# Patient Record
Sex: FEMALE | URBAN - METROPOLITAN AREA
[De-identification: names, ages, dates, MRNs, and addresses within clinical notes are randomized per-mention and may not be internally consistent; named-entity substitution may affect disease eponyms.]

---

## 2022-02-16 ENCOUNTER — EMERGENCY (EMERGENCY)
Facility: HOSPITAL | Age: 37
LOS: 0 days | Discharge: HOME | End: 2022-02-16
Attending: EMERGENCY MEDICINE | Admitting: EMERGENCY MEDICINE
Payer: COMMERCIAL

## 2022-02-16 VITALS
RESPIRATION RATE: 19 BRPM | OXYGEN SATURATION: 100 % | DIASTOLIC BLOOD PRESSURE: 64 MMHG | SYSTOLIC BLOOD PRESSURE: 144 MMHG | TEMPERATURE: 98 F | HEART RATE: 86 BPM

## 2022-02-16 DIAGNOSIS — R74.02 ELEVATION OF LEVELS OF LACTIC ACID DEHYDROGENASE [LDH]: ICD-10-CM

## 2022-02-16 DIAGNOSIS — R35.0 FREQUENCY OF MICTURITION: ICD-10-CM

## 2022-02-16 DIAGNOSIS — N20.0 CALCULUS OF KIDNEY: ICD-10-CM

## 2022-02-16 DIAGNOSIS — R11.0 NAUSEA: ICD-10-CM

## 2022-02-16 DIAGNOSIS — R10.9 UNSPECIFIED ABDOMINAL PAIN: ICD-10-CM

## 2022-02-16 LAB
ALBUMIN SERPL ELPH-MCNC: 5 G/DL — SIGNIFICANT CHANGE UP (ref 3.5–5.2)
ALP SERPL-CCNC: 50 U/L — SIGNIFICANT CHANGE UP (ref 30–115)
ALT FLD-CCNC: 19 U/L — SIGNIFICANT CHANGE UP (ref 0–41)
ANION GAP SERPL CALC-SCNC: 21 MMOL/L — HIGH (ref 7–14)
APPEARANCE UR: CLEAR — SIGNIFICANT CHANGE UP
AST SERPL-CCNC: 25 U/L — SIGNIFICANT CHANGE UP (ref 0–41)
BACTERIA # UR AUTO: NEGATIVE — SIGNIFICANT CHANGE UP
BASOPHILS # BLD AUTO: 0.04 K/UL — SIGNIFICANT CHANGE UP (ref 0–0.2)
BASOPHILS NFR BLD AUTO: 0.5 % — SIGNIFICANT CHANGE UP (ref 0–1)
BILIRUB SERPL-MCNC: 0.4 MG/DL — SIGNIFICANT CHANGE UP (ref 0.2–1.2)
BILIRUB UR-MCNC: NEGATIVE — SIGNIFICANT CHANGE UP
BUN SERPL-MCNC: 15 MG/DL — SIGNIFICANT CHANGE UP (ref 10–20)
CALCIUM SERPL-MCNC: 10.2 MG/DL — HIGH (ref 8.5–10.1)
CHLORIDE SERPL-SCNC: 101 MMOL/L — SIGNIFICANT CHANGE UP (ref 98–110)
CO2 SERPL-SCNC: 18 MMOL/L — SIGNIFICANT CHANGE UP (ref 17–32)
COLOR SPEC: YELLOW — SIGNIFICANT CHANGE UP
CREAT SERPL-MCNC: 1 MG/DL — SIGNIFICANT CHANGE UP (ref 0.7–1.5)
DIFF PNL FLD: ABNORMAL
EOSINOPHIL # BLD AUTO: 0.05 K/UL — SIGNIFICANT CHANGE UP (ref 0–0.7)
EOSINOPHIL NFR BLD AUTO: 0.6 % — SIGNIFICANT CHANGE UP (ref 0–8)
EPI CELLS # UR: 2 /HPF — SIGNIFICANT CHANGE UP (ref 0–5)
GLUCOSE SERPL-MCNC: 119 MG/DL — HIGH (ref 70–99)
GLUCOSE UR QL: NEGATIVE — SIGNIFICANT CHANGE UP
HCG SERPL QL: NEGATIVE — SIGNIFICANT CHANGE UP
HCT VFR BLD CALC: 38.1 % — SIGNIFICANT CHANGE UP (ref 37–47)
HGB BLD-MCNC: 12.5 G/DL — SIGNIFICANT CHANGE UP (ref 12–16)
HYALINE CASTS # UR AUTO: 3 /LPF — SIGNIFICANT CHANGE UP (ref 0–7)
IMM GRANULOCYTES NFR BLD AUTO: 1 % — HIGH (ref 0.1–0.3)
KETONES UR-MCNC: ABNORMAL
LACTATE SERPL-SCNC: 1.3 MMOL/L — SIGNIFICANT CHANGE UP (ref 0.7–2)
LACTATE SERPL-SCNC: 4.5 MMOL/L — CRITICAL HIGH (ref 0.7–2)
LEUKOCYTE ESTERASE UR-ACNC: NEGATIVE — SIGNIFICANT CHANGE UP
LIDOCAIN IGE QN: 42 U/L — SIGNIFICANT CHANGE UP (ref 7–60)
LYMPHOCYTES # BLD AUTO: 1.49 K/UL — SIGNIFICANT CHANGE UP (ref 1.2–3.4)
LYMPHOCYTES # BLD AUTO: 18.2 % — LOW (ref 20.5–51.1)
MCHC RBC-ENTMCNC: 28 PG — SIGNIFICANT CHANGE UP (ref 27–31)
MCHC RBC-ENTMCNC: 32.8 G/DL — SIGNIFICANT CHANGE UP (ref 32–37)
MCV RBC AUTO: 85.4 FL — SIGNIFICANT CHANGE UP (ref 81–99)
MONOCYTES # BLD AUTO: 0.33 K/UL — SIGNIFICANT CHANGE UP (ref 0.1–0.6)
MONOCYTES NFR BLD AUTO: 4 % — SIGNIFICANT CHANGE UP (ref 1.7–9.3)
NEUTROPHILS # BLD AUTO: 6.21 K/UL — SIGNIFICANT CHANGE UP (ref 1.4–6.5)
NEUTROPHILS NFR BLD AUTO: 75.7 % — HIGH (ref 42.2–75.2)
NITRITE UR-MCNC: NEGATIVE — SIGNIFICANT CHANGE UP
NRBC # BLD: 0 /100 WBCS — SIGNIFICANT CHANGE UP (ref 0–0)
PH UR: 8 — SIGNIFICANT CHANGE UP (ref 5–8)
PLATELET # BLD AUTO: 318 K/UL — SIGNIFICANT CHANGE UP (ref 130–400)
POTASSIUM SERPL-MCNC: 4.5 MMOL/L — SIGNIFICANT CHANGE UP (ref 3.5–5)
POTASSIUM SERPL-SCNC: 4.5 MMOL/L — SIGNIFICANT CHANGE UP (ref 3.5–5)
PROT SERPL-MCNC: 8 G/DL — SIGNIFICANT CHANGE UP (ref 6–8)
PROT UR-MCNC: SIGNIFICANT CHANGE UP
RBC # BLD: 4.46 M/UL — SIGNIFICANT CHANGE UP (ref 4.2–5.4)
RBC # FLD: 13.3 % — SIGNIFICANT CHANGE UP (ref 11.5–14.5)
RBC CASTS # UR COMP ASSIST: 5 /HPF — HIGH (ref 0–4)
SODIUM SERPL-SCNC: 140 MMOL/L — SIGNIFICANT CHANGE UP (ref 135–146)
SP GR SPEC: 1.02 — SIGNIFICANT CHANGE UP (ref 1.01–1.03)
UROBILINOGEN FLD QL: SIGNIFICANT CHANGE UP
WBC # BLD: 8.2 K/UL — SIGNIFICANT CHANGE UP (ref 4.8–10.8)
WBC # FLD AUTO: 8.2 K/UL — SIGNIFICANT CHANGE UP (ref 4.8–10.8)
WBC UR QL: 2 /HPF — SIGNIFICANT CHANGE UP (ref 0–5)

## 2022-02-16 PROCEDURE — 99285 EMERGENCY DEPT VISIT HI MDM: CPT

## 2022-02-16 PROCEDURE — 74177 CT ABD & PELVIS W/CONTRAST: CPT | Mod: 26,MA

## 2022-02-16 PROCEDURE — 76830 TRANSVAGINAL US NON-OB: CPT | Mod: 26

## 2022-02-16 RX ORDER — TAMSULOSIN HYDROCHLORIDE 0.4 MG/1
1 CAPSULE ORAL
Qty: 5 | Refills: 0
Start: 2022-02-16 | End: 2022-02-20

## 2022-02-16 RX ORDER — KETOROLAC TROMETHAMINE 30 MG/ML
30 SYRINGE (ML) INJECTION ONCE
Refills: 0 | Status: DISCONTINUED | OUTPATIENT
Start: 2022-02-16 | End: 2022-02-16

## 2022-02-16 RX ORDER — KETOROLAC TROMETHAMINE 30 MG/ML
1 SYRINGE (ML) INJECTION
Qty: 20 | Refills: 0
Start: 2022-02-16 | End: 2022-02-20

## 2022-02-16 RX ORDER — MORPHINE SULFATE 50 MG/1
4 CAPSULE, EXTENDED RELEASE ORAL ONCE
Refills: 0 | Status: DISCONTINUED | OUTPATIENT
Start: 2022-02-16 | End: 2022-02-16

## 2022-02-16 RX ORDER — SODIUM CHLORIDE 9 MG/ML
1000 INJECTION INTRAMUSCULAR; INTRAVENOUS; SUBCUTANEOUS ONCE
Refills: 0 | Status: COMPLETED | OUTPATIENT
Start: 2022-02-16 | End: 2022-02-16

## 2022-02-16 RX ORDER — TAMSULOSIN HYDROCHLORIDE 0.4 MG/1
0.4 CAPSULE ORAL AT BEDTIME
Refills: 0 | Status: DISCONTINUED | OUTPATIENT
Start: 2022-02-16 | End: 2022-02-16

## 2022-02-16 RX ORDER — ONDANSETRON 8 MG/1
4 TABLET, FILM COATED ORAL ONCE
Refills: 0 | Status: COMPLETED | OUTPATIENT
Start: 2022-02-16 | End: 2022-02-16

## 2022-02-16 RX ADMIN — SODIUM CHLORIDE 1000 MILLILITER(S): 9 INJECTION INTRAMUSCULAR; INTRAVENOUS; SUBCUTANEOUS at 14:23

## 2022-02-16 RX ADMIN — MORPHINE SULFATE 4 MILLIGRAM(S): 50 CAPSULE, EXTENDED RELEASE ORAL at 12:53

## 2022-02-16 RX ADMIN — Medication 30 MILLIGRAM(S): at 13:54

## 2022-02-16 RX ADMIN — MORPHINE SULFATE 4 MILLIGRAM(S): 50 CAPSULE, EXTENDED RELEASE ORAL at 13:47

## 2022-02-16 RX ADMIN — Medication 30 MILLIGRAM(S): at 14:09

## 2022-02-16 RX ADMIN — ONDANSETRON 4 MILLIGRAM(S): 8 TABLET, FILM COATED ORAL at 12:41

## 2022-02-16 RX ADMIN — MORPHINE SULFATE 4 MILLIGRAM(S): 50 CAPSULE, EXTENDED RELEASE ORAL at 12:42

## 2022-02-16 RX ADMIN — TAMSULOSIN HYDROCHLORIDE 0.4 MILLIGRAM(S): 0.4 CAPSULE ORAL at 16:33

## 2022-02-16 RX ADMIN — MORPHINE SULFATE 4 MILLIGRAM(S): 50 CAPSULE, EXTENDED RELEASE ORAL at 14:09

## 2022-02-16 RX ADMIN — SODIUM CHLORIDE 1000 MILLILITER(S): 9 INJECTION INTRAMUSCULAR; INTRAVENOUS; SUBCUTANEOUS at 13:35

## 2022-02-16 NOTE — ED PROVIDER NOTE - ATTENDING CONTRIBUTION TO CARE
36-year-old female presenting for evaluation of severe right-sided flank pain radiating to her right anterior abdomen since this morning.  Pain is associated with nausea and urinary frequency.  Denies hematuria, fever, chills, bloody stool, vaginal bleeding or discharge.  No such symptoms in the past. Female sitting on a chair appears uncomfortable due to pain, PERRL, pain conjunctiva, MMM, normal work of breathing,, speaking full sentences, right-sided abdominal wall and right CVA TTP, no leg edema, no focal neuro deficits, A&O x3.  Impression: Suspected renal colic, will rule out  ovarian pathology as well.  Plan: Pain control, IVF hydration, labs, pelvic sono, CT abdomen pelvis, reassess.

## 2022-02-16 NOTE — ED PROVIDER NOTE - NS ED ROS FT
Review of Systems:  	•	CONSTITUTIONAL - no fever, no diaphoresis, no chills  	•	SKIN - no rash  	•	HEMATOLOGIC - no bleeding, no bruising  	•	EYES - no eye pain, no blurry vision  	•	ENT - no congestion  	•	RESPIRATORY - no shortness of breath, no cough  	•	CARDIAC - no chest pain, no palpitations  	•	GI - + abd pain, + nausea, no vomiting, no diarrhea, no constipation  	•	GENITO-URINARY - +flank pain, no dysuria; no hematuria, + increased urinary frequency  	•	MUSCULOSKELETAL - no joint paint, no swelling, no redness  	•	NEUROLOGIC - no weakness, no headache, no paresthesias, no LOC  	•	PSYCH - no anxiety, non suicidal, non homicidal, no hallucination, no depression  	All other ROS are negative except as documented in HPI.

## 2022-02-16 NOTE — ED ADULT NURSE NOTE - OBJECTIVE STATEMENT
36 year old female alert and oriented c/o severe abdominal/flank pain, n/v. NO past med hx stated. VSS, IV placed

## 2022-02-16 NOTE — ED PROVIDER NOTE - PROVIDER TOKENS
PROVIDER:[TOKEN:[[id number]],FOLLOWUP:[1-3 Days]],PROVIDER:[TOKEN:[[id number]],FOLLOWUP:[Routine]]

## 2022-02-16 NOTE — ED PROVIDER NOTE - PROGRESS NOTE DETAILS
EP: The patient appears  more comfortable, pain well controlled, UA and CT scan are pending.  Continue observation. EP: CT scan shows a 3 x 3 x 2 mm stone in the right UVJ.  Initial lactate was elevated, repeat was weakness was sent after fluid administration.  All test results were discussed with the patient including incidental findings of irregularity of the cervix, she was advised to follow-up with her OB/GYN and with urology.  Strict return precautions given.  Patient verbalized understanding and is amenable with the plan. EP: CT scan shows a 3 x 3 x 2 mm stone in the right UVJ.  Initial lactate was elevated, repeat was weakness was sent after fluid administration.  All test results were discussed with the patient including incidental findings of irregularity of the cervix, she was advised to follow-up with her OB/GYN and with urology.  Strict return precautions given.  Patient verbalized understanding and is amenable with the plan. Patient to be discharged from ED. Any available test results were discussed with patient and/or family. Verbal instructions given, including instructions to return to ED immediately for any new, worsening, or concerning symptoms. Patient endorsed understanding. Written discharge instructions additionally given, including follow-up plan.  Patient was given opportunity to ask questions.

## 2022-02-16 NOTE — ED ADULT NURSE NOTE - NSIMPLEMENTINTERV_GEN_ALL_ED
Implemented All Universal Safety Interventions:  [place] to call system. Call bell, personal items and telephone within reach. Instruct patient to call for assistance. Room bathroom lighting operational. Non-slip footwear when patient is off stretcher. Physically safe environment: no spills, clutter or unnecessary equipment. Stretcher in lowest position, wheels locked, appropriate side rails in place.

## 2022-02-16 NOTE — ED PROVIDER NOTE - PATIENT PORTAL LINK FT
You can access the FollowMyHealth Patient Portal offered by [facility] by registering at the following website: [email or web address]. By joining [employer]’s FollowMyHealth portal, you will also be able to view your health information using other applications (apps) compatible with our system.

## 2022-02-16 NOTE — ED PROVIDER NOTE - PHYSICAL EXAMINATION
VITAL SIGNS: I have reviewed nursing notes and confirm.  CONSTITUTIONAL: Well-developed; well-nourished; in no acute distress.  SKIN: Skin exam is warm and dry, no acute rash.  HEAD: Normocephalic; atraumatic.  EYES: PERRL, EOM intact; conjunctiva and sclera clear.  ENT: No nasal discharge; airway clear.   NECK: Supple; non tender.  CARD: S1, S2 normal; no murmurs, gallops, or rubs. Regular rate and rhythm.  RESP: Clear to auscultation bilaterally. No wheezes, rales or rhonchi.  ABD: Normal bowel sounds; soft; non-distended; non-tender. +Right sided CVA tenderness.   EXT: Normal ROM. No edema.  LYMPH: No acute cervical adenopathy.  NEURO: Alert, oriented. Grossly unremarkable. No focal deficits.  PSYCH: Cooperative, appropriate.

## 2022-02-16 NOTE — ED PROVIDER NOTE - CARE PROVIDER_API CALL
Tone Owens)  Urology  [address] [facility] [address] [address] [facility], [address]  [place] [address]  Phone: [phone]  Fax: [phone]  Follow Up Time: 1-3 Days    Kenn Garcia)  Obstetrics and Gynecology  [address] [place] [address]  Phone: [phone]  Fax: [phone]  Follow Up Time: Routine

## 2022-02-16 NOTE — ED PROVIDER NOTE - OBJECTIVE STATEMENT
37 yo F with no pmhx presenting with sudden onset of severe right sided flank pain radiating to right lower abdomen since this morning. Symptoms are moderate. Associated with nausea and urinary frequency. No cp, sob, fever, chills, vomiting, diarrhea, vaginal discharge, dysuria, headache, dizziness, paresthesias, or weakness.

## 2022-02-16 NOTE — ED PROVIDER NOTE - CARE PROVIDERS DIRECT ADDRESSES
,[email or web address]

## 2022-02-17 ENCOUNTER — INPATIENT (INPATIENT)
Facility: HOSPITAL | Age: 37
LOS: 3 days | Discharge: HOME | End: 2022-02-21
Attending: UROLOGY | Admitting: UROLOGY
Payer: COMMERCIAL

## 2022-02-17 VITALS
OXYGEN SATURATION: 98 % | TEMPERATURE: 99 F | HEART RATE: 124 BPM | DIASTOLIC BLOOD PRESSURE: 62 MMHG | SYSTOLIC BLOOD PRESSURE: 105 MMHG | WEIGHT: 132.06 LBS | RESPIRATION RATE: 20 BRPM

## 2022-02-17 LAB
ALBUMIN SERPL ELPH-MCNC: 3.9 G/DL — SIGNIFICANT CHANGE UP (ref 3.5–5.2)
ALP SERPL-CCNC: 86 U/L — SIGNIFICANT CHANGE UP (ref 30–115)
ALT FLD-CCNC: 20 U/L — SIGNIFICANT CHANGE UP (ref 0–41)
ANION GAP SERPL CALC-SCNC: 14 MMOL/L — SIGNIFICANT CHANGE UP (ref 7–14)
ANISOCYTOSIS BLD QL: SLIGHT — SIGNIFICANT CHANGE UP
AST SERPL-CCNC: 21 U/L — SIGNIFICANT CHANGE UP (ref 0–41)
BASOPHILS # BLD AUTO: 0 K/UL — SIGNIFICANT CHANGE UP (ref 0–0.2)
BASOPHILS NFR BLD AUTO: 0 % — SIGNIFICANT CHANGE UP (ref 0–1)
BILIRUB SERPL-MCNC: 1.2 MG/DL — SIGNIFICANT CHANGE UP (ref 0.2–1.2)
BUN SERPL-MCNC: 20 MG/DL — SIGNIFICANT CHANGE UP (ref 10–20)
BURR CELLS BLD QL SMEAR: PRESENT — SIGNIFICANT CHANGE UP
CALCIUM SERPL-MCNC: 9 MG/DL — SIGNIFICANT CHANGE UP (ref 8.5–10.1)
CHLORIDE SERPL-SCNC: 99 MMOL/L — SIGNIFICANT CHANGE UP (ref 98–110)
CO2 SERPL-SCNC: 21 MMOL/L — SIGNIFICANT CHANGE UP (ref 17–32)
CREAT SERPL-MCNC: 1 MG/DL — SIGNIFICANT CHANGE UP (ref 0.7–1.5)
EOSINOPHIL # BLD AUTO: 0 K/UL — SIGNIFICANT CHANGE UP (ref 0–0.7)
EOSINOPHIL NFR BLD AUTO: 0 % — SIGNIFICANT CHANGE UP (ref 0–8)
GIANT PLATELETS BLD QL SMEAR: PRESENT — SIGNIFICANT CHANGE UP
GLUCOSE SERPL-MCNC: 88 MG/DL — SIGNIFICANT CHANGE UP (ref 70–99)
HCT VFR BLD CALC: 31.8 % — LOW (ref 37–47)
HGB BLD-MCNC: 10.7 G/DL — LOW (ref 12–16)
LACTATE SERPL-SCNC: 1.3 MMOL/L — SIGNIFICANT CHANGE UP (ref 0.7–2)
LYMPHOCYTES # BLD AUTO: 0 % — LOW (ref 20.5–51.1)
LYMPHOCYTES # BLD AUTO: 0 K/UL — LOW (ref 1.2–3.4)
MANUAL SMEAR VERIFICATION: SIGNIFICANT CHANGE UP
MCHC RBC-ENTMCNC: 28.3 PG — SIGNIFICANT CHANGE UP (ref 27–31)
MCHC RBC-ENTMCNC: 33.6 G/DL — SIGNIFICANT CHANGE UP (ref 32–37)
MCV RBC AUTO: 84.1 FL — SIGNIFICANT CHANGE UP (ref 81–99)
MICROCYTES BLD QL: SLIGHT — SIGNIFICANT CHANGE UP
MONOCYTES # BLD AUTO: 0 K/UL — LOW (ref 0.1–0.6)
MONOCYTES NFR BLD AUTO: 0 % — LOW (ref 1.7–9.3)
NEUTROPHILS # BLD AUTO: 5.22 K/UL — SIGNIFICANT CHANGE UP (ref 1.4–6.5)
NEUTROPHILS NFR BLD AUTO: 95.6 % — HIGH (ref 42.2–75.2)
NEUTS BAND # BLD: 1.8 % — SIGNIFICANT CHANGE UP (ref 0–6)
OVALOCYTES BLD QL SMEAR: SLIGHT — SIGNIFICANT CHANGE UP
PLAT MORPH BLD: ABNORMAL
PLATELET # BLD AUTO: 140 K/UL — SIGNIFICANT CHANGE UP (ref 130–400)
POIKILOCYTOSIS BLD QL AUTO: SLIGHT — SIGNIFICANT CHANGE UP
POTASSIUM SERPL-MCNC: 3.4 MMOL/L — LOW (ref 3.5–5)
POTASSIUM SERPL-SCNC: 3.4 MMOL/L — LOW (ref 3.5–5)
PROT SERPL-MCNC: 6.1 G/DL — SIGNIFICANT CHANGE UP (ref 6–8)
RBC # BLD: 3.78 M/UL — LOW (ref 4.2–5.4)
RBC # FLD: 13.5 % — SIGNIFICANT CHANGE UP (ref 11.5–14.5)
RBC BLD AUTO: ABNORMAL
SARS-COV-2 RNA SPEC QL NAA+PROBE: SIGNIFICANT CHANGE UP
SODIUM SERPL-SCNC: 134 MMOL/L — LOW (ref 135–146)
VARIANT LYMPHS # BLD: 2.6 % — SIGNIFICANT CHANGE UP (ref 0–5)
WBC # BLD: 5.36 K/UL — SIGNIFICANT CHANGE UP (ref 4.8–10.8)
WBC # FLD AUTO: 5.36 K/UL — SIGNIFICANT CHANGE UP (ref 4.8–10.8)

## 2022-02-17 PROCEDURE — 99285 EMERGENCY DEPT VISIT HI MDM: CPT

## 2022-02-17 RX ORDER — TAMSULOSIN HYDROCHLORIDE 0.4 MG/1
0.4 CAPSULE ORAL ONCE
Refills: 0 | Status: COMPLETED | OUTPATIENT
Start: 2022-02-17 | End: 2022-02-17

## 2022-02-17 RX ORDER — ONDANSETRON 8 MG/1
4 TABLET, FILM COATED ORAL ONCE
Refills: 0 | Status: COMPLETED | OUTPATIENT
Start: 2022-02-17 | End: 2022-02-17

## 2022-02-17 RX ORDER — SODIUM CHLORIDE 9 MG/ML
1000 INJECTION, SOLUTION INTRAVENOUS ONCE
Refills: 0 | Status: COMPLETED | OUTPATIENT
Start: 2022-02-17 | End: 2022-02-17

## 2022-02-17 RX ORDER — MORPHINE SULFATE 50 MG/1
4 CAPSULE, EXTENDED RELEASE ORAL ONCE
Refills: 0 | Status: DISCONTINUED | OUTPATIENT
Start: 2022-02-17 | End: 2022-02-17

## 2022-02-17 RX ADMIN — MORPHINE SULFATE 4 MILLIGRAM(S): 50 CAPSULE, EXTENDED RELEASE ORAL at 20:21

## 2022-02-17 RX ADMIN — SODIUM CHLORIDE 1000 MILLILITER(S): 9 INJECTION, SOLUTION INTRAVENOUS at 21:30

## 2022-02-17 RX ADMIN — SODIUM CHLORIDE 1000 MILLILITER(S): 9 INJECTION, SOLUTION INTRAVENOUS at 20:21

## 2022-02-17 RX ADMIN — TAMSULOSIN HYDROCHLORIDE 0.4 MILLIGRAM(S): 0.4 CAPSULE ORAL at 21:06

## 2022-02-17 RX ADMIN — ONDANSETRON 4 MILLIGRAM(S): 8 TABLET, FILM COATED ORAL at 20:21

## 2022-02-17 RX ADMIN — MORPHINE SULFATE 4 MILLIGRAM(S): 50 CAPSULE, EXTENDED RELEASE ORAL at 20:36

## 2022-02-17 RX ADMIN — SODIUM CHLORIDE 1000 MILLILITER(S): 9 INJECTION, SOLUTION INTRAVENOUS at 22:18

## 2022-02-17 NOTE — ED PROVIDER NOTE - NS ED ROS FT
Constitutional:  See HPI.   Eyes:  No visual changes, eye pain or discharge.  ENMT:  No hearing changes, pain, discharge or infections. No neck pain or stiffness.  Cardiac:  No chest pain, SOB or edema. No chest pain with exertion.  Respiratory:  No cough or respiratory distress. No hemoptysis.  GI:  No nausea, vomiting, diarrhea, abdominal pain.  :  dysuria, no frequency,   MS:  No joint pain or back pain.  Neuro:  No LOC. No headache or weakness.    Skin:  No skin rash.  Except as in HPI, all other review of systems is negative

## 2022-02-17 NOTE — CONSULT NOTE ADULT - ASSESSMENT
36 year old female with no pmh presents to the ED for evaluation of severe right-sided flank pain radiating to her right anterior abdomen since yesterday. Patient feeling better than yesterday overall, but still with mild right sided flank pain that is adequately controlled after receiving IV fluids, zofran and morphine.      CTAP which revealed a 4q8x4hi stone at the UVJ with mild right hydronephrosis and a US pelvic showing b/l ovarian cysts.    Plan:  - Case discussed with Dr. Pineda, recommendations are as follows:  - No acute surgical/ intervention indicated at this time - patient nontoxic appearing, pain controlled, WBC and Cr WNL.   - Discussed options with patient regarding trial of passage at home, trial of passage on admission, and the possible need for stent placement should the patient develop sepsis or intractable pain/ intractable vomiting despite pharmacologic management -  Patient electing for trial of passage at home as her pain and nausea is well controlled at this time. (Patient was not sent home with antiemetics yesterday and would like to be prescribed an antiemetic).  - Spoke with ED attending who will continue to bolus the patient, continue pain control and to monitor patient for now   - Recommend flomax if not contraindicated  - Continue pain control prn  - Continue antiemetics prn  - Encourage increased hydration  - Encourage increased ambulation  - Recommend strain all urine to catch the stone if it passes so that stone may be sent for analysis   - Recommend OP f/u with Dr. Pineda this week for further urologic management - Please call the office tomorrow morning for an appointment. Will email office and make them aware of patient encounter and the need for outpatient follow up so that office will also reach out to patient to schedule follow up.  - If patient is discharged, made sure patient understands to return to ED if develops fever >100.3, intractable pain/vomiting  - Recommend patient be discharged on Flomax, Toradol and Zofran.  - ED team and patient agreeable to plan  36 year old female with no significant pmh presents to the ED for evaluation of severe right-sided flank pain radiating to her right anterior abdomen since yesterday. Patient feeling better than yesterday overall, but still with mild right sided flank pain that is adequately controlled after receiving IV fluids, zofran and morphine.      CTAP which revealed a 5p0l1ne stone at the UVJ with mild right hydronephrosis and a US pelvic showing b/l ovarian cysts.    Plan:  - Case discussed with Dr. Pineda, recommendations are as follows:  - No acute surgical/ intervention indicated at this time - patient nontoxic appearing, pain controlled, WBC and Cr WNL.   - Discussed options with patient regarding trial of passage at home, trial of passage on admission, and the possible need for stent placement should the patient develop sepsis or intractable pain/ intractable vomiting despite pharmacologic management -  Patient electing for trial of passage at home as her pain and nausea is well controlled at this time. (Patient was not sent home with antiemetics yesterday and would like to be prescribed an antiemetic).  - Spoke with ED attending who will continue to bolus the patient, continue pain control and to monitor patient for now   - Recommend flomax if not contraindicated  - Continue pain control prn  - Continue antiemetics prn  - Encourage increased hydration  - Encourage increased ambulation  - Recommend strain all urine to catch the stone if it passes so that stone may be sent for analysis   - Recommend OP f/u with Dr. Pineda this week for further urologic management - Please call the office tomorrow morning for an appointment. Emailed office so they are aware of patient encounter and the need for outpatient follow up - office will also reach out to patient to schedule follow up.  - If patient is discharged, made sure patient understands to return to ED if develops fever >100.3, intractable pain/vomiting  - Recommend patient be discharged on Flomax, Toradol and Zofran.  - ED team and patient agreeable to plan

## 2022-02-17 NOTE — CONSULT NOTE ADULT - ATTENDING COMMENTS
seen at bedside  has been hypertensive during day  WBC went up since yesterday  will plan for sugery today -- emergeny right ureteral stent placement  cont abx post op  will need definitive treatemnt of stone and removal of stent as outpatient

## 2022-02-17 NOTE — ED PROVIDER NOTE - CLINICAL SUMMARY MEDICAL DECISION MAKING FREE TEXT BOX
Patient presented due to inability to tolerate p.o., started to feel better in ED but remained tachycardic, given concerns in setting of nephrolithiasis patient admitted to urology for further treatment and monitoring

## 2022-02-17 NOTE — ED PROVIDER NOTE - ATTENDING CONTRIBUTION TO CARE
36-year-old female with PMH nephrolithiasis presented complaining of persistent vomiting and right flank pain. Patient seen yesterday diagnosed with nephrolithiasis [id number] at right UVJ. Sx improved briefly but then recurred and since unable to tolerate meds or PO. Patient reports discomfort persistent but not worsened. No fevers, chills, dizziness, CP, SOB, cough or palpitations.    VITAL SIGNS: noted  CONSTITUTIONAL: Well-developed; well-nourished; in no acute distress  HEAD: Normocephalic; atraumatic  EYES: PERRL, EOM intact; conjunctiva and sclera clear  ENT: No nasal discharge; airway clear. MMM  NECK: Supple; non tender. No anterior cervical lymphadenopathy noted  CARD: S1, S2 normal; no murmurs, gallops, or rubs. Tachycardic.  RESP: CTAB/L, no wheezes, rales or rhonchi  ABD: Normal bowel sounds; soft; non-distended; non-tender; + right CVA tenderness  EXT: Normal ROM. No calf tenderness or edema. Distal pulses intact  NEURO: Alert, oriented. Grossly unremarkable. No focal deficits  SKIN: Skin exam is warm and dry, no acute rash  MS: No midline spinal tenderness

## 2022-02-17 NOTE — ED PROVIDER NOTE - CARE PLAN
1 Principal Discharge DX:	Kidney stone   Principal Discharge DX:	Kidney stone  Secondary Diagnosis:	Hypotension

## 2022-02-17 NOTE — ED PROVIDER NOTE - MDM PATIENT STATEMENT FOR ADDL TREATMENT
Patient with one or more new problems requiring additional work-up/treatment.
Health Care Proxy (HCP)

## 2022-02-17 NOTE — ED PROVIDER NOTE - PHYSICAL EXAMINATION
CONSTITUTIONAL: Well-appearing; well-nourished; in no apparent distress.   HEAD: Normocephalic; atraumatic.   EYES: PERRL; EOM intact. Conjunctiva normal B/L.   ENT: Normal pharynx with no tonsillar hypertrophy. MMM.  NECK: Supple; non-tender; no cervical lymphadenopathy.   CHEST: Normal chest excursion with respiration.   CARDIOVASCULAR: Normal S1, S2; no murmurs, rubs, or gallops.   RESPIRATORY: Normal chest excursion with respiration; breath sounds clear and equal bilaterally;   GI/: Normal bowel sounds; non-distended; non-tender. R flank pain  BACK: No evidence of trauma or deformity. Non-tender to palpation. +R CVA tenderness, ttp w/ radiation to the groin  EXT: Normal ROM in all four extremities; non-tender to palpation; distal pulses are normal. No leg edema B/L.   SKIN: Normal for age and race; warm; dry; good turgor.  NEURO: A & O x 4; CN 2-12 intact. Grossly unremarkable.

## 2022-02-17 NOTE — ED ADULT TRIAGE NOTE - CHIEF COMPLAINT QUOTE
c/o nausea and vomiting . patient was seen yesterday and was sent home with PO meds for kidney stone  . Patient c/o right sided flank pain since she is not tolerating prescribed meds .

## 2022-02-17 NOTE — ED PROVIDER NOTE - PROGRESS NOTE DETAILS
Tn - spoke w/ PA Faiz; recommends IV bolus, pain control, start flomas, CBC, BMP, renal bladder us to assess jets and stone position Discussed borderline BP, tachycardia with Urology PA and pt. Recommended admission for septic potential. Pt declines at this time, will sign out AMA. Risks and potential for decompensation discussed, pt understands. Will f/u with Urology clinic in the AM. Pt changed her mind, will stay in the hospital. Urology will not admit to their service because they don't plan on intervening or doing a procedure. Will admit to medicine.

## 2022-02-17 NOTE — ED PROVIDER NOTE - OBJECTIVE STATEMENT
36 y F presenting for evaluation of severe right-sided flank pain radiating to her right anterior abdomen since yesterday.  Pain is associated with nausea, vomiting, unable to tolerate PO. pt states that she was feeling better after discharge yesterday but then was unable to tolerate PO Rx, solids, and fluids today. pt states she feels dehydrated and intense pain of the R flank radiating to the R anterior abdomen. CT scan from yesterday shows a [id number] stone at the UVJ, US pelvic w/ b/l ovarian cysts. pt denies sob/cp.

## 2022-02-18 LAB
ALBUMIN SERPL ELPH-MCNC: 3.1 G/DL — LOW (ref 3.5–5.2)
ALP SERPL-CCNC: 44 U/L — SIGNIFICANT CHANGE UP (ref 30–115)
ALT FLD-CCNC: 22 U/L — SIGNIFICANT CHANGE UP (ref 0–41)
ANION GAP SERPL CALC-SCNC: 12 MMOL/L — SIGNIFICANT CHANGE UP (ref 7–14)
ANION GAP SERPL CALC-SCNC: 12 MMOL/L — SIGNIFICANT CHANGE UP (ref 7–14)
ANISOCYTOSIS BLD QL: SLIGHT — SIGNIFICANT CHANGE UP
APPEARANCE UR: ABNORMAL
AST SERPL-CCNC: 23 U/L — SIGNIFICANT CHANGE UP (ref 0–41)
BASOPHILS # BLD AUTO: 0 K/UL — SIGNIFICANT CHANGE UP (ref 0–0.2)
BASOPHILS # BLD AUTO: 0.03 K/UL — SIGNIFICANT CHANGE UP (ref 0–0.2)
BASOPHILS NFR BLD AUTO: 0 % — SIGNIFICANT CHANGE UP (ref 0–1)
BASOPHILS NFR BLD AUTO: 0.2 % — SIGNIFICANT CHANGE UP (ref 0–1)
BILIRUB SERPL-MCNC: 0.7 MG/DL — SIGNIFICANT CHANGE UP (ref 0.2–1.2)
BILIRUB UR-MCNC: ABNORMAL
BUN SERPL-MCNC: 20 MG/DL — SIGNIFICANT CHANGE UP (ref 10–20)
BUN SERPL-MCNC: 22 MG/DL — HIGH (ref 10–20)
CALCIUM SERPL-MCNC: 7.7 MG/DL — LOW (ref 8.5–10.1)
CALCIUM SERPL-MCNC: 8 MG/DL — LOW (ref 8.5–10.1)
CHLORIDE SERPL-SCNC: 102 MMOL/L — SIGNIFICANT CHANGE UP (ref 98–110)
CHLORIDE SERPL-SCNC: 102 MMOL/L — SIGNIFICANT CHANGE UP (ref 98–110)
CK MB CFR SERPL CALC: 4 NG/ML — SIGNIFICANT CHANGE UP (ref 0.6–6.3)
CK SERPL-CCNC: 397 U/L — HIGH (ref 0–225)
CO2 SERPL-SCNC: 22 MMOL/L — SIGNIFICANT CHANGE UP (ref 17–32)
CO2 SERPL-SCNC: 22 MMOL/L — SIGNIFICANT CHANGE UP (ref 17–32)
COLOR SPEC: ABNORMAL
CREAT SERPL-MCNC: 0.9 MG/DL — SIGNIFICANT CHANGE UP (ref 0.7–1.5)
CREAT SERPL-MCNC: 1.2 MG/DL — SIGNIFICANT CHANGE UP (ref 0.7–1.5)
DIFF PNL FLD: ABNORMAL
EOSINOPHIL # BLD AUTO: 0 K/UL — SIGNIFICANT CHANGE UP (ref 0–0.7)
EOSINOPHIL # BLD AUTO: 0.01 K/UL — SIGNIFICANT CHANGE UP (ref 0–0.7)
EOSINOPHIL NFR BLD AUTO: 0 % — SIGNIFICANT CHANGE UP (ref 0–8)
EOSINOPHIL NFR BLD AUTO: 0.1 % — SIGNIFICANT CHANGE UP (ref 0–8)
GIANT PLATELETS BLD QL SMEAR: PRESENT — SIGNIFICANT CHANGE UP
GLUCOSE BLDC GLUCOMTR-MCNC: 82 MG/DL — SIGNIFICANT CHANGE UP (ref 70–99)
GLUCOSE SERPL-MCNC: 108 MG/DL — HIGH (ref 70–99)
GLUCOSE SERPL-MCNC: 92 MG/DL — SIGNIFICANT CHANGE UP (ref 70–99)
GLUCOSE UR QL: NEGATIVE — SIGNIFICANT CHANGE UP
HCT VFR BLD CALC: 26.9 % — LOW (ref 37–47)
HCT VFR BLD CALC: 30.1 % — LOW (ref 37–47)
HGB BLD-MCNC: 10.1 G/DL — LOW (ref 12–16)
HGB BLD-MCNC: 9 G/DL — LOW (ref 12–16)
IMM GRANULOCYTES NFR BLD AUTO: 2.1 % — HIGH (ref 0.1–0.3)
KETONES UR-MCNC: ABNORMAL
LACTATE SERPL-SCNC: 1.9 MMOL/L — SIGNIFICANT CHANGE UP (ref 0.7–2)
LACTATE SERPL-SCNC: 2 MMOL/L — SIGNIFICANT CHANGE UP (ref 0.7–2)
LEUKOCYTE ESTERASE UR-ACNC: ABNORMAL
LYMPHOCYTES # BLD AUTO: 0.02 K/UL — LOW (ref 1.2–3.4)
LYMPHOCYTES # BLD AUTO: 0.59 K/UL — LOW (ref 1.2–3.4)
LYMPHOCYTES # BLD AUTO: 0.9 % — LOW (ref 20.5–51.1)
LYMPHOCYTES # BLD AUTO: 4.7 % — LOW (ref 20.5–51.1)
MAGNESIUM SERPL-MCNC: 1.3 MG/DL — LOW (ref 1.8–2.4)
MANUAL SMEAR VERIFICATION: SIGNIFICANT CHANGE UP
MCHC RBC-ENTMCNC: 28.9 PG — SIGNIFICANT CHANGE UP (ref 27–31)
MCHC RBC-ENTMCNC: 29.1 PG — SIGNIFICANT CHANGE UP (ref 27–31)
MCHC RBC-ENTMCNC: 33.5 G/DL — SIGNIFICANT CHANGE UP (ref 32–37)
MCHC RBC-ENTMCNC: 33.6 G/DL — SIGNIFICANT CHANGE UP (ref 32–37)
MCV RBC AUTO: 86.2 FL — SIGNIFICANT CHANGE UP (ref 81–99)
MCV RBC AUTO: 87.1 FL — SIGNIFICANT CHANGE UP (ref 81–99)
MONOCYTES # BLD AUTO: 0.02 K/UL — LOW (ref 0.1–0.6)
MONOCYTES # BLD AUTO: 0.09 K/UL — LOW (ref 0.1–0.6)
MONOCYTES NFR BLD AUTO: 0.7 % — LOW (ref 1.7–9.3)
MONOCYTES NFR BLD AUTO: 0.9 % — LOW (ref 1.7–9.3)
NEUTROPHILS # BLD AUTO: 11.6 K/UL — HIGH (ref 1.4–6.5)
NEUTROPHILS # BLD AUTO: 2.68 K/UL — SIGNIFICANT CHANGE UP (ref 1.4–6.5)
NEUTROPHILS NFR BLD AUTO: 92.2 % — HIGH (ref 42.2–75.2)
NEUTROPHILS NFR BLD AUTO: 93.9 % — HIGH (ref 42.2–75.2)
NEUTS BAND # BLD: 4.3 % — SIGNIFICANT CHANGE UP (ref 0–6)
NITRITE UR-MCNC: NEGATIVE — SIGNIFICANT CHANGE UP
NRBC # BLD: 0 /100 WBCS — SIGNIFICANT CHANGE UP (ref 0–0)
PH UR: 6 — SIGNIFICANT CHANGE UP (ref 5–8)
PHOSPHATE SERPL-MCNC: 1.8 MG/DL — LOW (ref 2.1–4.9)
PLAT MORPH BLD: NORMAL — SIGNIFICANT CHANGE UP
PLATELET # BLD AUTO: 113 K/UL — LOW (ref 130–400)
PLATELET # BLD AUTO: 136 K/UL — SIGNIFICANT CHANGE UP (ref 130–400)
POIKILOCYTOSIS BLD QL AUTO: SIGNIFICANT CHANGE UP
POLYCHROMASIA BLD QL SMEAR: SLIGHT — SIGNIFICANT CHANGE UP
POTASSIUM SERPL-MCNC: 3.7 MMOL/L — SIGNIFICANT CHANGE UP (ref 3.5–5)
POTASSIUM SERPL-MCNC: 3.9 MMOL/L — SIGNIFICANT CHANGE UP (ref 3.5–5)
POTASSIUM SERPL-SCNC: 3.7 MMOL/L — SIGNIFICANT CHANGE UP (ref 3.5–5)
POTASSIUM SERPL-SCNC: 3.9 MMOL/L — SIGNIFICANT CHANGE UP (ref 3.5–5)
PROCALCITONIN SERPL-MCNC: 14.4 NG/ML — HIGH (ref 0.02–0.1)
PROT SERPL-MCNC: 5 G/DL — LOW (ref 6–8)
PROT UR-MCNC: ABNORMAL
RBC # BLD: 3.09 M/UL — LOW (ref 4.2–5.4)
RBC # BLD: 3.49 M/UL — LOW (ref 4.2–5.4)
RBC # FLD: 13.9 % — SIGNIFICANT CHANGE UP (ref 11.5–14.5)
RBC # FLD: 14 % — SIGNIFICANT CHANGE UP (ref 11.5–14.5)
RBC BLD AUTO: ABNORMAL
SODIUM SERPL-SCNC: 136 MMOL/L — SIGNIFICANT CHANGE UP (ref 135–146)
SODIUM SERPL-SCNC: 136 MMOL/L — SIGNIFICANT CHANGE UP (ref 135–146)
SP GR SPEC: 1.02 — SIGNIFICANT CHANGE UP (ref 1.01–1.03)
TROPONIN T SERPL-MCNC: 0.09 NG/ML — CRITICAL HIGH
UROBILINOGEN FLD QL: ABNORMAL
WBC # BLD: 12.58 K/UL — HIGH (ref 4.8–10.8)
WBC # BLD: 2.73 K/UL — LOW (ref 4.8–10.8)
WBC # FLD AUTO: 12.58 K/UL — HIGH (ref 4.8–10.8)
WBC # FLD AUTO: 2.73 K/UL — LOW (ref 4.8–10.8)

## 2022-02-18 PROCEDURE — 93010 ELECTROCARDIOGRAM REPORT: CPT

## 2022-02-18 PROCEDURE — 52332 CYSTOSCOPY AND TREATMENT: CPT | Mod: RT

## 2022-02-18 PROCEDURE — 99223 1ST HOSP IP/OBS HIGH 75: CPT

## 2022-02-18 PROCEDURE — 99291 CRITICAL CARE FIRST HOUR: CPT

## 2022-02-18 PROCEDURE — 99285 EMERGENCY DEPT VISIT HI MDM: CPT | Mod: 57

## 2022-02-18 PROCEDURE — 71045 X-RAY EXAM CHEST 1 VIEW: CPT | Mod: 26

## 2022-02-18 RX ORDER — CEFTRIAXONE 500 MG/1
1000 INJECTION, POWDER, FOR SOLUTION INTRAMUSCULAR; INTRAVENOUS ONCE
Refills: 0 | Status: DISCONTINUED | OUTPATIENT
Start: 2022-02-18 | End: 2022-02-18

## 2022-02-18 RX ORDER — HYDROMORPHONE HYDROCHLORIDE 2 MG/ML
0.5 INJECTION INTRAMUSCULAR; INTRAVENOUS; SUBCUTANEOUS EVERY 4 HOURS
Refills: 0 | Status: DISCONTINUED | OUTPATIENT
Start: 2022-02-18 | End: 2022-02-18

## 2022-02-18 RX ORDER — MORPHINE SULFATE 50 MG/1
2 CAPSULE, EXTENDED RELEASE ORAL
Refills: 0 | Status: DISCONTINUED | OUTPATIENT
Start: 2022-02-18 | End: 2022-02-18

## 2022-02-18 RX ORDER — KETOROLAC TROMETHAMINE 30 MG/ML
15 SYRINGE (ML) INJECTION ONCE
Refills: 0 | Status: DISCONTINUED | OUTPATIENT
Start: 2022-02-18 | End: 2022-02-18

## 2022-02-18 RX ORDER — ACETAMINOPHEN 500 MG
650 TABLET ORAL EVERY 6 HOURS
Refills: 0 | Status: DISCONTINUED | OUTPATIENT
Start: 2022-02-18 | End: 2022-02-20

## 2022-02-18 RX ORDER — NOREPINEPHRINE BITARTRATE/D5W 8 MG/250ML
0.05 PLASTIC BAG, INJECTION (ML) INTRAVENOUS
Qty: 8 | Refills: 0 | Status: DISCONTINUED | OUTPATIENT
Start: 2022-02-18 | End: 2022-02-18

## 2022-02-18 RX ORDER — SODIUM CHLORIDE 9 MG/ML
1000 INJECTION INTRAMUSCULAR; INTRAVENOUS; SUBCUTANEOUS
Refills: 0 | Status: DISCONTINUED | OUTPATIENT
Start: 2022-02-18 | End: 2022-02-18

## 2022-02-18 RX ORDER — KETOROLAC TROMETHAMINE 30 MG/ML
10 SYRINGE (ML) INJECTION EVERY 4 HOURS
Refills: 0 | Status: DISCONTINUED | OUTPATIENT
Start: 2022-02-18 | End: 2022-02-18

## 2022-02-18 RX ORDER — CEFTRIAXONE 500 MG/1
1000 INJECTION, POWDER, FOR SOLUTION INTRAMUSCULAR; INTRAVENOUS EVERY 24 HOURS
Refills: 0 | Status: DISCONTINUED | OUTPATIENT
Start: 2022-02-19 | End: 2022-02-19

## 2022-02-18 RX ORDER — ACETAMINOPHEN 500 MG
1000 TABLET ORAL ONCE
Refills: 0 | Status: COMPLETED | OUTPATIENT
Start: 2022-02-18 | End: 2022-02-18

## 2022-02-18 RX ORDER — SODIUM CHLORIDE 9 MG/ML
250 INJECTION, SOLUTION INTRAVENOUS ONCE
Refills: 0 | Status: COMPLETED | OUTPATIENT
Start: 2022-02-18 | End: 2022-02-18

## 2022-02-18 RX ORDER — SODIUM CHLORIDE 9 MG/ML
1000 INJECTION INTRAMUSCULAR; INTRAVENOUS; SUBCUTANEOUS ONCE
Refills: 0 | Status: COMPLETED | OUTPATIENT
Start: 2022-02-18 | End: 2022-02-18

## 2022-02-18 RX ORDER — ONDANSETRON 8 MG/1
4 TABLET, FILM COATED ORAL EVERY 6 HOURS
Refills: 0 | Status: DISCONTINUED | OUTPATIENT
Start: 2022-02-18 | End: 2022-02-18

## 2022-02-18 RX ORDER — TAMSULOSIN HYDROCHLORIDE 0.4 MG/1
0.4 CAPSULE ORAL AT BEDTIME
Refills: 0 | Status: DISCONTINUED | OUTPATIENT
Start: 2022-02-18 | End: 2022-02-21

## 2022-02-18 RX ORDER — CEFTRIAXONE 500 MG/1
1000 INJECTION, POWDER, FOR SOLUTION INTRAMUSCULAR; INTRAVENOUS ONCE
Refills: 0 | Status: COMPLETED | OUTPATIENT
Start: 2022-02-18 | End: 2022-02-18

## 2022-02-18 RX ORDER — INFLUENZA VIRUS VACCINE 15; 15; 15; 15 UG/.5ML; UG/.5ML; UG/.5ML; UG/.5ML
0.5 SUSPENSION INTRAMUSCULAR ONCE
Refills: 0 | Status: COMPLETED | OUTPATIENT
Start: 2022-02-18 | End: 2022-02-18

## 2022-02-18 RX ORDER — KETOROLAC TROMETHAMINE 30 MG/ML
25 SYRINGE (ML) INJECTION EVERY 6 HOURS
Refills: 0 | Status: DISCONTINUED | OUTPATIENT
Start: 2022-02-18 | End: 2022-02-18

## 2022-02-18 RX ORDER — ONDANSETRON 8 MG/1
4 TABLET, FILM COATED ORAL ONCE
Refills: 0 | Status: DISCONTINUED | OUTPATIENT
Start: 2022-02-18 | End: 2022-02-18

## 2022-02-18 RX ORDER — ENOXAPARIN SODIUM 100 MG/ML
40 INJECTION SUBCUTANEOUS DAILY
Refills: 0 | Status: DISCONTINUED | OUTPATIENT
Start: 2022-02-19 | End: 2022-02-21

## 2022-02-18 RX ORDER — PANTOPRAZOLE SODIUM 20 MG/1
40 TABLET, DELAYED RELEASE ORAL
Refills: 0 | Status: DISCONTINUED | OUTPATIENT
Start: 2022-02-18 | End: 2022-02-18

## 2022-02-18 RX ORDER — OXYCODONE AND ACETAMINOPHEN 5; 325 MG/1; MG/1
2 TABLET ORAL EVERY 4 HOURS
Refills: 0 | Status: DISCONTINUED | OUTPATIENT
Start: 2022-02-18 | End: 2022-02-18

## 2022-02-18 RX ORDER — ONDANSETRON 8 MG/1
4 TABLET, FILM COATED ORAL EVERY 6 HOURS
Refills: 0 | Status: DISCONTINUED | OUTPATIENT
Start: 2022-02-18 | End: 2022-02-21

## 2022-02-18 RX ORDER — ACETAMINOPHEN 500 MG
650 TABLET ORAL EVERY 6 HOURS
Refills: 0 | Status: DISCONTINUED | OUTPATIENT
Start: 2022-02-18 | End: 2022-02-18

## 2022-02-18 RX ORDER — ENOXAPARIN SODIUM 100 MG/ML
40 INJECTION SUBCUTANEOUS DAILY
Refills: 0 | Status: DISCONTINUED | OUTPATIENT
Start: 2022-02-18 | End: 2022-02-18

## 2022-02-18 RX ORDER — CEFTRIAXONE 500 MG/1
INJECTION, POWDER, FOR SOLUTION INTRAMUSCULAR; INTRAVENOUS
Refills: 0 | Status: DISCONTINUED | OUTPATIENT
Start: 2022-02-18 | End: 2022-02-18

## 2022-02-18 RX ORDER — SODIUM CHLORIDE 9 MG/ML
500 INJECTION INTRAMUSCULAR; INTRAVENOUS; SUBCUTANEOUS ONCE
Refills: 0 | Status: DISCONTINUED | OUTPATIENT
Start: 2022-02-18 | End: 2022-02-18

## 2022-02-18 RX ORDER — METHOCARBAMOL 500 MG/1
750 TABLET, FILM COATED ORAL
Refills: 0 | Status: DISCONTINUED | OUTPATIENT
Start: 2022-02-18 | End: 2022-02-21

## 2022-02-18 RX ORDER — OXYCODONE HYDROCHLORIDE 5 MG/1
5 TABLET ORAL EVERY 4 HOURS
Refills: 0 | Status: DISCONTINUED | OUTPATIENT
Start: 2022-02-18 | End: 2022-02-21

## 2022-02-18 RX ORDER — MORPHINE SULFATE 50 MG/1
4 CAPSULE, EXTENDED RELEASE ORAL
Refills: 0 | Status: DISCONTINUED | OUTPATIENT
Start: 2022-02-18 | End: 2022-02-18

## 2022-02-18 RX ORDER — SODIUM CHLORIDE 9 MG/ML
1000 INJECTION, SOLUTION INTRAVENOUS
Refills: 0 | Status: DISCONTINUED | OUTPATIENT
Start: 2022-02-18 | End: 2022-02-19

## 2022-02-18 RX ORDER — SODIUM CHLORIDE 9 MG/ML
1000 INJECTION, SOLUTION INTRAVENOUS
Refills: 0 | Status: DISCONTINUED | OUTPATIENT
Start: 2022-02-18 | End: 2022-02-18

## 2022-02-18 RX ORDER — TAMSULOSIN HYDROCHLORIDE 0.4 MG/1
0.4 CAPSULE ORAL AT BEDTIME
Refills: 0 | Status: DISCONTINUED | OUTPATIENT
Start: 2022-02-18 | End: 2022-02-18

## 2022-02-18 RX ORDER — GABAPENTIN 400 MG/1
100 CAPSULE ORAL THREE TIMES A DAY
Refills: 0 | Status: DISCONTINUED | OUTPATIENT
Start: 2022-02-18 | End: 2022-02-21

## 2022-02-18 RX ORDER — MAGNESIUM SULFATE 500 MG/ML
2 VIAL (ML) INJECTION ONCE
Refills: 0 | Status: COMPLETED | OUTPATIENT
Start: 2022-02-18 | End: 2022-02-18

## 2022-02-18 RX ORDER — MEPERIDINE HYDROCHLORIDE 50 MG/ML
12.5 INJECTION INTRAMUSCULAR; INTRAVENOUS; SUBCUTANEOUS
Refills: 0 | Status: DISCONTINUED | OUTPATIENT
Start: 2022-02-18 | End: 2022-02-18

## 2022-02-18 RX ORDER — CEFTRIAXONE 500 MG/1
1000 INJECTION, POWDER, FOR SOLUTION INTRAMUSCULAR; INTRAVENOUS EVERY 24 HOURS
Refills: 0 | Status: CANCELLED | OUTPATIENT
Start: 2022-02-19 | End: 2022-02-18

## 2022-02-18 RX ORDER — POTASSIUM PHOSPHATE, MONOBASIC POTASSIUM PHOSPHATE, DIBASIC 236; 224 MG/ML; MG/ML
15 INJECTION, SOLUTION INTRAVENOUS ONCE
Refills: 0 | Status: DISCONTINUED | OUTPATIENT
Start: 2022-02-18 | End: 2022-02-18

## 2022-02-18 RX ADMIN — SODIUM CHLORIDE 100 MILLILITER(S): 9 INJECTION, SOLUTION INTRAVENOUS at 17:36

## 2022-02-18 RX ADMIN — PANTOPRAZOLE SODIUM 40 MILLIGRAM(S): 20 TABLET, DELAYED RELEASE ORAL at 05:21

## 2022-02-18 RX ADMIN — SODIUM CHLORIDE 125 MILLILITER(S): 9 INJECTION INTRAMUSCULAR; INTRAVENOUS; SUBCUTANEOUS at 12:22

## 2022-02-18 RX ADMIN — Medication 15 MILLIGRAM(S): at 12:02

## 2022-02-18 RX ADMIN — SODIUM CHLORIDE 100 MILLILITER(S): 9 INJECTION INTRAMUSCULAR; INTRAVENOUS; SUBCUTANEOUS at 05:12

## 2022-02-18 RX ADMIN — Medication 25 GRAM(S): at 17:36

## 2022-02-18 RX ADMIN — SODIUM CHLORIDE 1000 MILLILITER(S): 9 INJECTION INTRAMUSCULAR; INTRAVENOUS; SUBCUTANEOUS at 16:14

## 2022-02-18 RX ADMIN — HYDROMORPHONE HYDROCHLORIDE 0.5 MILLIGRAM(S): 2 INJECTION INTRAMUSCULAR; INTRAVENOUS; SUBCUTANEOUS at 20:09

## 2022-02-18 RX ADMIN — Medication 85 MILLIMOLE(S): at 20:19

## 2022-02-18 RX ADMIN — GABAPENTIN 100 MILLIGRAM(S): 400 CAPSULE ORAL at 22:08

## 2022-02-18 RX ADMIN — METHOCARBAMOL 750 MILLIGRAM(S): 500 TABLET, FILM COATED ORAL at 23:36

## 2022-02-18 RX ADMIN — OXYCODONE AND ACETAMINOPHEN 2 TABLET(S): 5; 325 TABLET ORAL at 04:27

## 2022-02-18 RX ADMIN — HYDROMORPHONE HYDROCHLORIDE 0.5 MILLIGRAM(S): 2 INJECTION INTRAMUSCULAR; INTRAVENOUS; SUBCUTANEOUS at 18:55

## 2022-02-18 RX ADMIN — ONDANSETRON 4 MILLIGRAM(S): 8 TABLET, FILM COATED ORAL at 04:04

## 2022-02-18 RX ADMIN — Medication 650 MILLIGRAM(S): at 17:45

## 2022-02-18 RX ADMIN — OXYCODONE AND ACETAMINOPHEN 2 TABLET(S): 5; 325 TABLET ORAL at 03:57

## 2022-02-18 RX ADMIN — CEFTRIAXONE 100 MILLIGRAM(S): 500 INJECTION, POWDER, FOR SOLUTION INTRAMUSCULAR; INTRAVENOUS at 13:17

## 2022-02-18 RX ADMIN — SODIUM CHLORIDE 100 MILLILITER(S): 9 INJECTION INTRAMUSCULAR; INTRAVENOUS; SUBCUTANEOUS at 07:51

## 2022-02-18 RX ADMIN — Medication 400 MILLIGRAM(S): at 13:18

## 2022-02-18 RX ADMIN — ONDANSETRON 4 MILLIGRAM(S): 8 TABLET, FILM COATED ORAL at 10:36

## 2022-02-18 NOTE — ED ADULT NURSE NOTE - OBJECTIVE STATEMENT
pt in the ed for right-sided flank pain radiating to her right anterior abdomen since yesterday.  Pain is associated with nausea, vomiting, unable to tolerate PO  pt was here yesterday, was feeling better and got dc. presents today again for vomitting

## 2022-02-18 NOTE — H&P ADULT - NSHPREVIEWOFSYSTEMS_GEN_ALL_CORE
REVIEW OF SYSTEMS:    CONSTITUTIONAL: No weakness, fevers or chills  EYES/ENT: No visual changes;  No vertigo or throat pain   NECK: No pain or stiffness  RESPIRATORY: No cough, wheezing, hemoptysis; No shortness of breath  CARDIOVASCULAR: No chest pain or palpitations  GASTROINTESTINAL: No abdominal or epigastric pain. No nausea, vomiting, or hematemesis; No diarrhea or constipation. No melena or hematochezia.  GENITOURINARY: No dysuria, frequency or hematuria  NEUROLOGICAL: No numbness or weakness  SKIN: No itching, rashes REVIEW OF SYSTEMS:    CONSTITUTIONAL: No weakness, fevers or chills  EYES/ENT: No visual changes;  No vertigo or throat pain   NECK: No pain or stiffness  RESPIRATORY: No cough, wheezing, hemoptysis; No shortness of breath  CARDIOVASCULAR: No chest pain or palpitations  GASTROINTESTINAL: R flank pain and nausea. or epigastric pain. No hematemesis; No diarrhea or constipation. No melena or hematochezia.  GENITOURINARY: No dysuria, frequency or hematuria  NEUROLOGICAL: No numbness or weakness  SKIN: No itching, rashes

## 2022-02-18 NOTE — CONSULT NOTE ADULT - ATTENDING COMMENTS
37 y/o female with Right Ureteral Stone.  Right hydronephrosis. UTI.  S/p Cystoscopy and Right Ureteral Stent Placement.  Postoperative Hypotension.  Hypovolemia.  Atelectasis.  Right flank pain.  GISELLA (Creat 1.2 from 0.9)    PLAN:  - pain control  - incentive spirometer  - continuous O2sat and cardiac monitoring  - iv hydration  - use Nicom   - follow serum electrolytes and UOP  - put on Zosyn  - ID eval  - GI and DVT prophylaxis  Admit to SICU    This note reflects my exam and care of this patient on 02/18/2022 37 y/o female with Right Ureteral Stone.  Right hydronephrosis. Pyelonephritis.  S/p Cystoscopy and Right Ureteral Stent Placement.  Postoperative Hypotension.  Hypovolemia.  Atelectasis.  Right flank pain.  GISELLA (Creat 1.2 from 0.9)    PLAN:  - pain control  - incentive spirometer  - continuous O2sat and cardiac monitoring  - iv hydration  - use Nicom   - follow serum electrolytes and UOP  - put on Zosyn  - ID eval  - GI and DVT prophylaxis  Admit to SICU    This note reflects my exam and care of this patient on 02/18/2022

## 2022-02-18 NOTE — BRIEF OPERATIVE NOTE - OPERATION/FINDINGS
right ureteral stent 6 x 28  pus from right kidney stent for culture right ureteral stent 6 x 28  pus from right kidney stent for culture.

## 2022-02-18 NOTE — BRIEF OPERATIVE NOTE - NSICDXBRIEFPOSTOP_GEN_ALL_CORE_FT
POST-OP DIAGNOSIS:  Sepsis 18-Feb-2022 15:27:25  Oral Villegas  Right ureteral stone 18-Feb-2022 15:27:34  Oral Villegas  Flank pain 18-Feb-2022 15:27:58  Oral Villegas

## 2022-02-18 NOTE — CONSULT NOTE ADULT - CONSULT REASON
HYPOtension and tachycardia s/p cysto w/ right urethral stent placement secondary to hydronephrosis in the setting on stones HYPOtension and tachycardia s/p cysto w/ right ureteral stent placement secondary to hydronephrosis in the setting on stones

## 2022-02-18 NOTE — H&P ADULT - ATTENDING COMMENTS
35 yo F no PMHx presenting to ED for severe right-sided flank pain x 2days.     History goes back to yesterday when presented to the ED for same complaint, had CT A/P which revealed a 3b2u8rr stone at the UVJ with mild right hydronephrosis and a US pelvic showing b/l ovarian cysts. Patient received fluids and pain meds and was d/c from the ED with Flomax. Today she returns with 7-10/10, R flank pain, nausea, inability to tolerate po and hold down meds.    On my exam, 2/18, Right flank TTP.   reports N/V/Chills/Rigors.     # UVJ stone (CT A/P which revealed a 3v7h6qf stone) - Complicated by   # Apparent Right Pyelonephritis:   Repeat UA (2/18) +   leukocytosis noted on 2/18.     Empiric CTX   Urology took her for ureteral stent.   Pus from Right kidney sent for culture.    ID c/s     - Pain control with Tylenol, percocet  - C/w flomax. zofran PRN  - C/w IVF NS @ 100 cc/hr      DVT ppx: lovenox  GI ppx: Protonix   Diet: regular  Activity: IAT  Dispo: IV abx , f/u Cxs

## 2022-02-18 NOTE — CONSULT NOTE ADULT - SUBJECTIVE AND OBJECTIVE BOX
GILBERTO HESS     36y Female    MRN-[id number]    Admit Date: 02-18-22  Hospital LOS:   ICU Admission Date:  OR #1-        ============================   HPI   HPI:  37 yo F no PMHx presenting to ED for severe right-sided flank pain x 2days.     History goes back to yesterday when presented to the ED for same complaint, had CT A/P which revealed a [id number] stone at the UVJ with mild right hydronephrosis and a US pelvic showing b/l ovarian cysts. Patient received fluids and pain meds and was d/c from the ED with Flomax. Today she returns with 7-10/10, R flank pain, nausea, inability to tolerate po and hold down meds. Endorses, dehydration and intense pain of the R flank radiating to the R anterior abdomen.     In the ED, VS T: 99 HR: 86 BP: 88/51 RR: 17 SpO2: 98% in RA. Labs notable for Hb 10.7 (12.5 day before).     Admitted for pain control and hydration.    (18 Feb 2022 02:46)          Consults-  Consult Note Adult-Urology Physician Assistant/Attending [NANCY Chaudhry] (02-17-22)      Procedure:  OR Stats  OR Time:               EBL:          IV Fluids:       Blood Products:                UOP:      Findings-         24 Hour Events  -Admission under SICU service        [X] A ten-point review of systems was otherwise negative except as noted above.  [  ] Due to altered mental status/intubation, subjective information was not attained from the patient. History was obtained, to the extent possible, from review of the chart and collateral sources of information.    ==========================    PMH  PAST MEDICAL & SURGICAL HISTORY:      Home Meds:  Home Medications:       Allergies  Allergies    No Known Allergies    Intolerances                   Current Medications:  acetaminophen     Tablet .. 650 milliGRAM(s) Oral every 6 hours PRN Temp greater or equal to 38C (100.4F), Mild Pain (1 - 3)  influenza   Vaccine 0.5 milliLiter(s) IntraMuscular once  lactated ringers. 1000 milliLiter(s) (150 mL/Hr) IV Continuous <Continuous>  meperidine     Injectable 12.5 milliGRAM(s) IV Push every 10 minutes PRN Shivering  morphine  - Injectable 4 milliGRAM(s) IV Push every 10 minutes PRN Severe Pain (7 - 10)  morphine  - Injectable 2 milliGRAM(s) IV Push every 10 minutes PRN Moderate Pain (4 - 6)  ondansetron Injectable 4 milliGRAM(s) IV Push once PRN Nausea and/or Vomiting  ondansetron Injectable 4 milliGRAM(s) IV Push every 6 hours PRN Nausea and/or Vomiting  pantoprazole    Tablet 40 milliGRAM(s) Oral before breakfast  sodium chloride 0.9%. 1000 milliLiter(s) (125 mL/Hr) IV Continuous <Continuous>      VITAL SIGNS, INS/OUTS (Last 24hours):    ICU Vital Signs Last 24 Hrs  T(C): 37.4 (18 Feb 2022 15:22), Max: 37.4 (18 Feb 2022 15:22)  T(F): 99.3 (18 Feb 2022 15:22), Max: 99.3 (18 Feb 2022 15:22)  HR: 120 (18 Feb 2022 14:22) (77 - 160)  BP: 111/58 (18 Feb 2022 14:22) (78/41 - 139/67)  BP(mean): --  ABP: --  ABP(mean): --  RR: 19 (18 Feb 2022 14:22) (17 - 20)  SpO2: 95% (18 Feb 2022 14:22) (95% - 100%)    I&O's Summary          Physical Exam:   ----------------------------------------------------------------------------------------------------------  GCS:      Exam: A&Ox3, no focal deficits    RESPIRATORY:  Normal expansion/effort  Mechanical Ventilation:     CARDIOVASCULAR:   S1/S2.  RRR  No peripheral edema    GASTROINTESTINAL:  Abdomen soft, non-tender, non-distended    MUSCULOSKELETAL:  Extremities warm, pink, well-perfused.    DERM:  No skin breakdown     :   Exam: Burton catheter in place.       Weight (kg): 59.9 (02-18-22)    Tubes/Lines/Drains   ----------------------------------------------------------------------------------------------------------  PIV     GILBERTO HESS     36y Female    MRN-[id number]    Admit Date: 02-18-22  Hospital LOS:  2  ICU Admission Date: 2/18  OR #1-2/18        ============================   HPI   35 yo female no PMHx presenting to ED for severe right-sided flank pain x 2days. Patient presented 2/16 with right flank pain, discharged home with flomax and analgesics.  Patient returns 2/17 c/o n/v unable to keep oral medications down and 7/10 right flank pain. CT revealed mild right hydronephrosis in the setting on [id number] calculus at UVJ. Patient admitted for pain management in the setting of kidney stones this being her first episode, no other medical history. Patient overnight became tachycardia, febrile, HYPOtensive, brought to OR for right ureteral stent placement.    Post op patient remained tachycardic HR 120s, SBP 70s, given 1 L fluid with appropriate response. Patient voided 100cc post op, c/o suprapubic tenderness. Patient mentating well resting comfortably in bed.    SDU  admission in the setting of post operative tachycardia and hypotension secondary to ureteral calculus.    CT Abdomen and Pelvis w/ IV Cont (02.16.22 @ 15:19) >  Mild right hydroureteronephrosis secondary to a 3 x 2 x 3 mm calculus in the region of the ureterovesicular junction.        Procedure: s/p right ureteral stent  OR Stats  OR Time:               EBL:          IV Fluids:       Blood Products:                UOP:      Findings-right ureteral stent 6 x28, pus from right kidney stent         24 Hour Events  -Admission under SICU service        [X] A ten-point review of systems was otherwise negative except as noted above.  [  ] Due to altered mental status/intubation, subjective information was not attained from the patient. History was obtained, to the extent possible, from review of the chart and collateral sources of information.    ==========================    PMH  PAST MEDICAL & SURGICAL HISTORY:      Home Meds:  Home Medications:       Allergies  None                     Current Medications:  acetaminophen     Tablet .. 650 milliGRAM(s) Oral every 6 hours PRN Temp greater or equal to 38C (100.4F), Mild Pain (1 - 3)  influenza   Vaccine 0.5 milliLiter(s) IntraMuscular once  lactated ringers. 1000 milliLiter(s) (150 mL/Hr) IV Continuous <Continuous>  meperidine     Injectable 12.5 milliGRAM(s) IV Push every 10 minutes PRN Shivering  morphine  - Injectable 4 milliGRAM(s) IV Push every 10 minutes PRN Severe Pain (7 - 10)  morphine  - Injectable 2 milliGRAM(s) IV Push every 10 minutes PRN Moderate Pain (4 - 6)  ondansetron Injectable 4 milliGRAM(s) IV Push once PRN Nausea and/or Vomiting  ondansetron Injectable 4 milliGRAM(s) IV Push every 6 hours PRN Nausea and/or Vomiting  pantoprazole    Tablet 40 milliGRAM(s) Oral before breakfast  sodium chloride 0.9%. 1000 milliLiter(s) (125 mL/Hr) IV Continuous <Continuous>      VITAL SIGNS, INS/OUTS (Last 24hours):    ICU Vital Signs Last 24 Hrs  T(C): 37.4 (18 Feb 2022 15:22), Max: 37.4 (18 Feb 2022 15:22)  T(F): 99.3 (18 Feb 2022 15:22), Max: 99.3 (18 Feb 2022 15:22)  HR: 120 (18 Feb 2022 14:22) (77 - 160)  BP: 111/58 (18 Feb 2022 14:22) (78/41 - 139/67)  BP(mean): --  ABP: --  ABP(mean): --  RR: 19 (18 Feb 2022 14:22) (17 - 20)  SpO2: 95% (18 Feb 2022 14:22) (95% - 100%)    I&O's Summary          Physical Exam:   ----------------------------------------------------------------------------------------------------------  Exam: A&Ox3, no focal deficits    RESPIRATORY:  Normal expansion/effort  Mechanical Ventilation:     CARDIOVASCULAR:   S1/S2.  RRR    GASTROINTESTINAL:  Abdomen soft, non-tender, non-distended  suprapubic tenderness    MUSCULOSKELETAL:  Extremities warm, pink, well-perfused.    DERM:  No skin breakdown     :   Exam: no العراقي  no vaginal bleeding noted    Weight (kg): 59.9 (02-18-22)    Tubes/Lines/Drains   ----------------------------------------------------------------------------------------------------------  PIV

## 2022-02-18 NOTE — PATIENT PROFILE ADULT - NSPROGENBLOODRESTRICT_GEN_A_NUR
GYN PROBLEM VISIT        Cc:  Here for   Chief Complaint   Patient presents with   • IUD     insert        HPI:  Lucinda Woods is a 49 year old  female with past medical hx significant for anxiety/depression, breast cancer S/P tx (declining Tamoxifen), bilateral salpingectomy, abnormal uterine bleeding who presents for Mirena IUD insertion. Patient reports she is doing well at today's visit without concerns. Vaginal bleeding has stopped, though patient very frustrated with ongoing bleeding and desiring treatment. Patient does not feel she can be expectantly managed until menopause. On review of patient's chart, patient was diagnosed and treated for breast cancer in 2017; this was reviewed with patient in detail. Given this, would not recommend hormonal therapies for treatment of abnormal uterine bleeding due to concern for risk of reoccurrence (all Category 3). Patient adamantly declining hysterectomy, as she feels this is \"too invasive.\" She is most interested in endometrial ablation, as she has a friend who had this performed and did well. Patient wanting something to bridge her to menopause. She is not currently on Tamoxifen and reports she has been counseled about this by her Oncologist; she feels it is \"too risky\" and states she is not planning to ever take this again.     Evaluation for AUB has consisted of:  *Pelvic US 2019 uterus measures 9.2 x 4.4 x 4.9 cm with endometrial stripe of 6 mm; ovaries normal bilaterally  *Pap LSIL, HPV + 2019 S/P colposcopy revealing LUCÍA I; plan for repeat pap at 12 months  *EMB - disordered proliferative endometrium 2019  *TSH normal   *CBC normal     OB hx:  x 1 without complications.     PMH:   Past Medical History:   Diagnosis Date   • Anxiety    • Arthritis    • Attention deficit disorder     ADHD   • Cervical radiculopathy    • Complex regional pain syndrome i of right upper limb    • COPD (chronic obstructive pulmonary disease) (CMS/[facility])    • Depression     • Gastroesophageal reflux disease    • Insomnia due to mental condition    • Lateral epicondylitis    • Malignant neoplasm (CMS/HCC) 12/2016    right breast Invasive lobular carcinoma   • Other cervical disc degeneration at C5-C6 level     and C6-C7   • Other specified dorsopathies, cervical region     C7-T1   • Pneumonia     infancy   • Segmental and somatic dysfunction of lumbar region 2017   • Segmental and somatic dysfunction of sacral region 2017     PSH:   Past Surgical History:   Procedure Laterality Date   • ----------mammogram---------- Left 12/05/2018   • Breast reconstruction Right 03/02/2017    Right direct implant breast reconstruction with revision and drainage placement   • Breast surgery Right 02/01/2017    mastectomy   • Cholecystectomy  1995   • Tubal ligation  05/10/2017    Complete bilateral salpingectomies, all anatomy is seen was normal     Meds:   (Not in a hospital admission)    Outpatient Medications Marked as Taking for the 9/23/19 encounter (Office Visit) with Maci Elam, DO   Medication Sig Dispense Refill   • traMADol (ULTRAM) 50 MG tablet Take 50 mg by mouth every 6 hours as needed for Pain.     • ranitidine (ZANTAC) 150 MG tablet Take 1 tablet by mouth 2 times daily. 180 tablet 1   • albuterol 108 (90 Base) MCG/ACT inhaler INHALE 2 PUFFS BY MOUTH EVERY 4 TO 6 HOURS AS NEEDED FOR 8.5 g 0   • gabapentin (NEURONTIN) 300 MG capsule Take 300 mg by mouth 3 times daily.     • cetirizine (ZYRTEC) 10 MG tablet TAKE 1 TABLET BY MOUTH DAILY 30 tablet 6   • sumatriptan (IMITREX) 50 MG tablet TAKE 1 TABLET BY MOUTH AT ONSET OF MIGRAINE, MAY REPEAT AFTER 2 HOURS IF NEEDED 10 tablet 3   • UNKNOWN TO PATIENT 5HTP     • ibuprofen (MOTRIN) 600 MG tablet Take 1 tablet by mouth 4 times daily (before meals and nightly). 60 tablet 3   • diclofenac (VOLTAREN) 1 % gel Apply topically 4 times daily. Apply to the affected area 4 times a day. 300 g 0   • atomoxetine (STRATTERA) 100 MG capsule TAKE 1  CAPSULE BY MOUTH DAILY 30 capsule 0   • fluticasone (FLONASE) 50 MCG/ACT nasal spray SHAKE LIQUID AND USE 2 SPRAYS IN EACH NOSTRIL DAILY 60 Bottle 1   • Cyanocobalamin (VITAMIN B 12 PO) Take 500 mg by mouth 2 times daily.     • ALPRAZolam (XANAX) 0.25 MG tablet Take 1 tablet by mouth nightly as needed for Sleep or Anxiety. 30 tablet 2       Allergies:   ALLERGIES:   Allergen Reactions   • Iodine   (Environmental Or Med) Other (See Comments)     tingling     Social:   Social History     Socioeconomic History   • Marital status: Single     Spouse name: Not on file   • Number of children: 1   • Years of education: Not on file   • Highest education level: High school graduate   Occupational History   • Occupation: [employer]    Social Needs   • Financial resource strain: Not on file   • Food insecurity:     Worry: Not on file     Inability: Not on file   • Transportation needs:     Medical: Not on file     Non-medical: Not on file   Tobacco Use   • Smoking status: Former Smoker     Types: Cigarettes     Last attempt to quit: 2017     Years since quittin.7   • Smokeless tobacco: Never Used   • Tobacco comment: on/off smoker since 10 yoa; smoked about 1/3 ppd when smoking   Substance and Sexual Activity   • Alcohol use: Yes     Comment: twice a week, 3 drinks   • Drug use: Yes     Frequency: 3.0 times per week     Types: Marijuana   • Sexual activity: Yes     Partners: Male     Birth control/protection: Surgical   Lifestyle   • Physical activity:     Days per week: Not on file     Minutes per session: Not on file   • Stress: Not on file   Relationships   • Social connections:     Talks on phone: Not on file     Gets together: Not on file     Attends Congregation service: Not on file     Active member of club or organization: Not on file     Attends meetings of clubs or organizations: Not on file     Relationship status: Not on file   • Intimate partner violence:     Fear of current or ex partner: Not on file      Emotionally abused: Not on file     Physically abused: Not on file     Forced sexual activity: Not on file   Other Topics Concern   • Not on file   Social History Narrative   • Not on file     FMH:   Family History   Problem Relation Age of Onset   • Cancer Mother         stomach   • COPD Mother    • Diabetes Father    • Hypertension Father    • Hypertension Sister    • Cancer Brother         1 brother - bladder CA   • Hypertension Brother         2 HTN   • Cancer Paternal Grandmother         ovarian       OB/Gyn History:   OB History    Para Term  AB Living   1 1 1 0 0 1   SAB TAB Ectopic Molar Multiple Live Births   0 0 0 0 0 1           Patient's last menstrual period was 2019.    PHYSICAL EXAM  Visit Vitals  /82   Ht 5' 3\" (1.6 m)   Wt 74.8 kg   LMP 2019   BMI 29.23 kg/m²     Gen: alert, normal affect, no apparent distress, AAOx3  Abd: soft, non tender, non distended, no masses   : Inspection of external genitalia reveals normal mons pubis, pubic hair distribution, labia minora, majora and clitoris. Sterile speculum exam reveals pink and moist vaginal mucosa. Cervix is free from any lesions. No cervical motion tenderness. Uterus normal in size, anteverted, non tender, mobile. No adnexal tenderness or masses palpated bilaterally.     PROCEDURE  Given it has been 6 months since her last biopsy and patient is desiring endometrial ablation which has yet to be scheduled, discussed recommendation to repeat biopsy prior to surgery. Patient also requesting this be performed again. Risks, benefits, and alternatives of endometrial biopsy were reviewed including: bleeding, infection, damage to surrounding tissues, uterine perforation. All questions answered, and surgical consent form signed. Speculum inserted. Cervix cleansed with Hibiclens x 3 due to allergy. Endometrial biopsy pipelle was inserted without difficulty under sterile conditions x 2. Endometrial sample obtained and sent to  pathology for further evaluation. All instruments were removed. Excellent hemostasis noted. Sponge and instrument counts correct x 2. The patient tolerated the procedure well without complications.     ASSESSMENT/PLAN:   Lucinda Woods is a 49 year old  female with past medical hx significant for anxiety/depression, breast cancer S/P tx (with no plans for Tamoxifen), bilateral salpingectomy, abnormal uterine bleeding who presents for Mirena IUD insertion; however, on further discussion and chart review, would not recommend given her hx of breast cancer (Category 3); patient also wanting to avoid hormonal therapy. She does not desire to use Tamoxifen. S/P extensive discussion was again had with patient regarding risks, benefits, and alternatives of management/treatment options. We then reviewed surgical treatment options including: endometrial ablation and definitive surgical management with hysterectomy. Reviewed risks, benefits, side effects, length of procedures, expected recovery course. Hysterectomy is the definitive treatment for uterine bleeding. This procedure has a high rate of patient satisfaction because it is curative, is not associated with drug-related side effects, and does not require repeated procedures or prolonged follow-up. On the other hand, hysterectomy has a risk of perioperative complications (bleeding, increased risk of blood transfusion, infection, damage to other organs particularly bowel or bladder, laparotomy, need for additional procedures) and a longer prolonged recovery. At this time, patient very adamant that she does not want to proceed with hysterectomy and desires to first try endometrial ablation. S/P discussion regarding risks, benefits, and alternatives of diagnostic, possible operative hysteroscopy, endometrial ablation with Novasure. Discussed that a successful result is most likely to be a reduction in the volume of uterine bleeding, and amenorrhea is not guaranteed. The  most common complications associated with endometrial ablation are uterine perforation necessitating laparoscopy/laparotomy and additional procedures, hemorrhage, hematometra, and pelvic infection. Additionally, reviewed that if patient ever desired to be on Tamoxifen, this does increase her risk of endometrial cancer, and endometrial sampling can become more difficult S/P ablation. She may have some pain and unusual vaginal bleeding after the procedure. We discussed need for dependable contraception with endometrial ablation seconadry to poor maternal and pregnancy outcomes if she were to become pregnant; patient already has hx of bilateral salpingectomy. After extensive discussion, patient strongly desires to proceed. Surgical request sent to [facility], our .     Greater than 50% of this 30 minute visit was spent in counseling the patient on management/treatment options for AUB in her setting, endometrial ablation.    Maci Elam, DO   none

## 2022-02-18 NOTE — PRE-OP CHECKLIST - SITE MARKED BY SURGEON
04/22/21 0700   General Information   Type of Visit Initial OP Ortho PT Evaluation   Start of Care Date 04/22/21   Referring Physician Dr Patel   Patient/Family Goals Statement regain strength   Orders Evaluate and Treat   Date of Order 04/22/21   Certification Required? Yes   Medical Diagnosis T11 burst fracture, physical deconditioning   Surgical/Medical history reviewed Yes   Precautions/Limitations spinal precautions  (back brace for 3 months)   Body Part(s)   Body Part(s) Lumbar Spine/SI   Presentation and Etiology   Pertinent history of current problem (include personal factors and/or comorbidities that impact the POC) Patient fell in his home on 4/12/2021 and suffered a T11 burst fracture. He does not recall the fall and has a history of syncopal episodes related to a complete heart block prior to pacemaker implantation in November 2019.  He was instructed to wear a back brace for the next 3 months to facilitate healing of his fractured vertebra.  He presents today with his daughter to address deconditioning and learn some strengthening exercises to do to prevent further deconditioning as he heals from his injury   Impairments A. Pain;F. Decreased strength and endurance   Functional Limitations perform activities of daily living   Symptom Location back and legs   How/Where did it occur   (ee above)   Onset date of current episode/exacerbation 04/12/21   Chronicity New   Pain rating (0-10 point scale)   (1-2/10)   Pain quality C. Aching   Frequency of pain/symptoms C. With activity   Pain/symptoms are: Worse during the day   Pain/symptoms exacerbated by G. Certain positions   Pain/symptoms eased by C. Rest   Progression of symptoms since onset: Improved   Current / Previous Interventions   Diagnostic Tests:   (ee chart)   Prior Level of Function   Prior Level of Function-Mobility ND - spc   Current Level of Function   Current Community Support Family/friend caregiver   Patient role/employment history  F. Retired   Fall Risk Screen   Fall screen completed by PT   Have you fallen 2 or more times in the past year? No   Have you fallen and had an injury in the past year? Yes   Is patient a fall risk? Yes   Fall screen comments will address in PT   Abuse Screen (yes response referral indicated)   Feels Unsafe at Home or Work/School no   Feels Threatened by Someone no   Does Anyone Try to Keep You From Having Contact with Others or Doing Things Outside Your Home? no   Physical Signs of Abuse Present no   Lumbar Spine/SI Objective Findings   Observation no acute distress, with his daughter today   Integumentary WNL   Posture slightly rounded shoulders, tlso in place   Gait/Locomotion slow, but appears steady, non-antalgic   Flexion ROM NT due to spinal precautions   Extension ROM NT   Right Side Bending ROM NT   Left Side Bending ROM NT   Lumbar ROM Comment NT due to spinal precautions, but functional motion appreciated   Hip Screen B hip motion is WFL   Transversus Abdominus Strength (Mateusz Leg Lowering-deg) weak 3/5   Hip Flexion (L2) Strength 5-/5   Hip Abduction Strength 4/5   Hip Adduction Strength 4/5   Knee Flexion Strength 5/5   Knee Extension (L3) Strength 5-./5   Neurological Testing Comments intact and WNL B   Palpation Grossly non-tender   Planned Therapy Interventions   Planned Therapy Interventions balance training;manual therapy;neuromuscular re-education;strengthening;stretching;ROM   Planned Modality Interventions   Planned Modality Interventions Comments as needed   Clinical Impression   Criteria for Skilled Therapeutic Interventions Met yes, treatment indicated   PT Diagnosis post T11 burst fracture   Influenced by the following impairments weakness, deconditioning   Functional limitations due to impairments difficulty performing normal daily functions   Clinical Presentation Stable/Uncomplicated   Clinical Presentation Rationale due to lack of additional comorbidities that may influence anticipated  PT progress   Clinical Decision Making (Complexity) Low complexity   Therapy Frequency 1 time/week   Predicted Duration of Therapy Intervention (days/wks) up to 8 weeks   Risk & Benefits of therapy have been explained Yes   Patient, Family & other staff in agreement with plan of care Yes   Clinical Impression Comments Presentation is consistent with post t11 burst fracture that is expected to improve with skilled PT intervention focusing on strengthening and functional mobility   Education Assessment   Preferred Learning Style Demonstration   Barriers to Learning No barriers  (* cognitive - has daughter present)   ORTHO GOALS   PT Ortho Eval Goals 1;2;3   Ortho Goal 1   Goal Identifier STG 1   Goal Description Patient will be compliant with HEP in order to make progress at home   Target Date 05/13/21   Ortho Goal 2   Goal Identifier LTG 1   Goal Description Patient will demonstrate increased strength in core to 3+/5 in order to protect spine ging forward   Target Date 05/27/21   Ortho Goal 3   Goal Identifier LTG 2   Goal Description Patient will report return to normal functions with back brace and spinal precautions and feel safe doing so in order to improve quality of life   Target Date 06/03/21   Total Evaluation Time   PT Eval, Low Complexity Minutes (12127) 15   Therapy Certification   Certification date from 04/22/21   Certification date to 06/03/21   Medical Diagnosis post T11 burst fracture, physical deconditioning     I certify the need for these services furnished under this plan of treatment and while under my care. (Physician co-signature of this document indicates review and certification of the therapy plan).     yes

## 2022-02-18 NOTE — PROGRESS NOTE ADULT - ASSESSMENT
Pt is a 36y F POD#0 s/p cystoscopy, R ureteral stent placement.     ·	Pt transferred to urology service  ·	SICU consulted for hemodynamic monitoring   ·	Cont abx; s/p ceftriaxone   ·	f/u UCx sensitivities; f/u intraop cx   ·	Flomax   ·	Will need definitive stone management for definitive stone management   Pt is a 36y F POD#0 s/p cystoscopy, R ureteral stent placement.     ·	Pt transferred to urology service  ·	SICU consulted for hemodynamic monitoring   ·	Cont abx; s/p ceftriaxone   ·	f/u UCx sensitivities; f/u intraop cx; Bcx   ·	Flomax   ·	Will need definitive stone management for definitive stone management   Pt is a 36y F POD#0 s/p cystoscopy, R ureteral stent placement.     ·	Pt transferred to urology service  ·	SICU consulted for hemodynamic monitoring   ·	Cont abx; s/p ceftriaxone; f/u ID recs   ·	f/u UCx sensitivities; f/u intraop cx; Bcx   ·	Flomax   ·	Will need outpatient follow up for definitive stone management

## 2022-02-18 NOTE — H&P ADULT - NSHPPHYSICALEXAM_GEN_ALL_CORE
PHYSICAL EXAM:  GENERAL: NAD, lying in bed comfortably  HEAD:  Atraumatic, Normocephalic  EYES: EOMI, PERRLA, conjunctiva and sclera clear  ENT: Moist mucous membranes  NECK: Supple, No JVD  CHEST/LUNG: Clear to auscultation bilaterally; No rales, rhonchi, wheezing, or rubs. Unlabored respirations  HEART: Regular rate and rhythm; No murmurs, rubs, or gallops  ABDOMEN: Bowel sounds present; Soft, Nontender, Nondistended. No hepatomegally  EXTREMITIES:  2+ Peripheral Pulses, brisk capillary refill. No clubbing, cyanosis, or edema  NERVOUS SYSTEM:  Alert & Oriented X3, speech clear. No deficits   MSK: FROM all 4 extremities, full and equal strength  SKIN: No rashes or lesions PHYSICAL EXAM:  GENERAL: NAD, lying in bed comfortably  HEAD:  Atraumatic, Normocephalic  EYES: EOMI, PERRLA, conjunctiva and sclera clear  ENT: Moist mucous membranes  NECK: Supple, No JVD  CHEST/LUNG: Clear to auscultation bilaterally; No rales, rhonchi, wheezing, or rubs. Unlabored respirations  HEART: Regular rate and rhythm; No murmurs, rubs, or gallops  ABDOMEN: Bowel sounds present; Soft, Nontender, Nondistended, + CVA R  EXTREMITIES:  2+ Peripheral Pulses, brisk capillary refill. No clubbing, cyanosis, or edema  NERVOUS SYSTEM:  Alert & Oriented X3, speech clear. No deficits   MSK: FROM all 4 extremities, full and equal strength  SKIN: No rashes or lesions

## 2022-02-18 NOTE — BRIEF OPERATIVE NOTE - NSICDXBRIEFPREOP_GEN_ALL_CORE_FT
PRE-OP DIAGNOSIS:  Sepsis 18-Feb-2022 15:26:40  Oral Villegas  Right ureteral stone 18-Feb-2022 15:27:04  Oral Villegas  Rt flank pain 18-Feb-2022 15:27:12  Oral Villegas

## 2022-02-18 NOTE — PROGRESS NOTE ADULT - SUBJECTIVE AND OBJECTIVE BOX
UROLOGY PROGRESS NOTE    Pt is a 36y F POD#0 s/p cystoscopy, R ureteral stent placement. Intra-op, noted to have pus behind stone. Pt seen and examined at bedside in PACU.     MEDICATIONS  (STANDING):  influenza   Vaccine 0.5 milliLiter(s) IntraMuscular once  lactated ringers. 1000 milliLiter(s) (100 mL/Hr) IV Continuous <Continuous>  norepinephrine Infusion 0.05 MICROgram(s)/kG/Min (5.62 mL/Hr) IV Continuous <Continuous>  pantoprazole    Tablet 40 milliGRAM(s) Oral before breakfast    MEDICATIONS  (PRN):  acetaminophen     Tablet .. 650 milliGRAM(s) Oral every 6 hours PRN Temp greater or equal to 38C (100.4F), Mild Pain (1 - 3)  HYDROmorphone  Injectable 0.5 milliGRAM(s) IV Push every 4 hours PRN Severe Pain (7 - 10)  meperidine     Injectable 12.5 milliGRAM(s) IV Push every 10 minutes PRN Shivering  ondansetron Injectable 4 milliGRAM(s) IV Push every 6 hours PRN Nausea and/or Vomiting    Review of Systems:  [X] A ten point review of systems was otherwise negative except as noted.    Vital Signs Last 24 Hrs  T(C): 37.4 (2022 15:22), Max: 37.4 (2022 15:22)  T(F): 99.3 (2022 15:22), Max: 99.3 (2022 15:22)  HR: 96 (2022 16:45) (77 - 160)  BP: 92/53 (2022 16:45) (78/41 - 139/67)  BP(mean): 66 (2022 16:45) (62 - 68)  RR: 15 (2022 16:45) (13 - 20)  SpO2: 98% (2022 16:45) (95% - 100%)    PHYSICAL EXAM:  GEN: NAD  NEURO: Awake and alert   SKIN: Good color, non diaphoretic  HEENT: NC/AT  RESP: Non-labored breathing; +NC in place   ABDO: Soft, ND   EXT: MAEx4       LABS:                        9.0    12.58 )-----------( 136      ( 2022 04:30 )             26.9     -18    136  |  102  |  20  ----------------------------<  108<H>  3.9   |  22  |  0.9    Ca    7.7<L>      2022 04:30    TPro  5.0<L>  /  Alb  3.1<L>  /  TBili  0.7  /  DBili  x   /  AST  23  /  ALT  22  /  AlkPhos  44  18    Urinalysis Basic - ( 2022 10:26 )  Color: Emilia / Appearance: Slightly Turbid / S.023 / pH: x  Gluc: x / Ketone: Small  / Bili: Small / Urobili: 3 mg/dL   Blood: x / Protein: 30 mg/dL / Nitrite: Negative   Leuk Esterase: Large / RBC: x / WBC x   Sq Epi: x / Non Sq Epi: x / Bacteria: x

## 2022-02-18 NOTE — CHART NOTE - NSCHARTNOTEFT_GEN_A_CORE
PACU ANESTHESIA ADMISSION NOTE      Procedure: Cystoscopic insertion of ureteral stent      Post op diagnosis:  Sepsis    Right ureteral stone    Flank pain        ____  Intubated  TV:______       Rate: ______      FiO2: ______    _x___  Patent Airway    _x___  Full return of protective reflexes    _x___  Full recovery from anesthesia / back to baseline status    Vitals:    See anesthesia record      Mental Status:  _x___ Awake   _____ Alert   _____ Drowsy   _____ Sedated    Nausea/Vomiting:  _x___  NO       ______Yes,   See Post - Op Orders         Pain Scale (0-10):  __0___    Treatment: _x___ None    ____ See Post - Op/PCA Orders    Post - Operative Fluids:   __x__ Oral   ____ See Post - Op Orders    Plan: Discharge:   ____Home       __x___Floor     _____Critical Care    _____  Other:_________________    Comments:  No anesthesia issues or complications noted.  Discharge when criteria met.

## 2022-02-18 NOTE — CONSULT NOTE ADULT - ASSESSMENT
Assessment & Plan    36y Female  s/p     NEURO:    Acute pain-controlled with   Inpatient Rx: acetaminophen     Tablet .. 650 milliGRAM(s) Oral every 6 hours PRN  meperidine     Injectable 12.5 milliGRAM(s) IV Push every 10 minutes PRN  morphine  - Injectable 4 milliGRAM(s) IV Push every 10 minutes PRN  morphine  - Injectable 2 milliGRAM(s) IV Push every 10 minutes PRN  ondansetron Injectable 4 milliGRAM(s) IV Push once PRN  ondansetron Injectable 4 milliGRAM(s) IV Push every 6 hours PRN        RESP:     Oxygenation-wean off NC to RA as tolerated    Activity-          Current Rx:     Home Rx:     Inpatient Rx-          CARDS:     Imaging:     Labs:     Rx-    Inpatient Rx:       GI/NUTR:     Diet, Regular      Diet, Regular (02-18-22 @ 15:45) [Active]          GI Prophylaxis-    Bowel regimen-last bowel movement         Inpatient Rx: pantoprazole    Tablet 40 milliGRAM(s) Oral before breakfast      /RENAL:        Monitor UO-العراقي in place    Current Rx:     Labs:          BUN/Cr- 20/1.0  -->,  20/0.9  -->          Electrolytes-Na 136 // K 3.9 // Mg -- //  Phos -- (02-18 @ 04:30)      Home Rx:          HEME/ONC:       DVT prophylaxis-, SCDs    Labs: Hb/Hct:  10.7/31.8  -->,  9.0/26.9  -->                      Plts:  140  -->,  136  -->                 PTT/INR:        Home Rx:       ID:  WBC- 8.20  --->>,  5.36  --->>,  12.58  --->>  Temp trend- 24hrs T(F): 99.3 (02-18 @ 15:22), Max: 99.3 (02-18 @ 15:22)  Antibiotics-influenza   Vaccine 0.5 once    Cultures:        (collected 02-16 @ 15:07)  Source: Clean Catch Clean Catch (Midstream)  Preliminary Report:    >100,000 CFU/ml Escherichia coli          ENDO:     Glucose, Serum: 108 (02-18 @ 04:30)       HA1C     LINES/DRAINS:  PIV, Devorah, العراقي     Advanced Directives: Presumed Full Code    HCP/Emergency Contact-    Indication: HYPOtension and tachycardia s/p cysto w/ right ureteral stent placement secondary to hydronephrosis in the setting on stones    DISPO:    SDU/SICU discussed with attending   Assessment & Plan    36y Female HD#2 POD#0  s/p right cystoscopic right ureteral stent placement secondary to right mild hydronephrosis    NEURO:    Acute pain-controlled with acetaminophen, oxy prn    RESP:     Oxygenation-wean off NC to RA as tolerated    Activity-ambulate as tolerated         CARDS:     acute tachycardia-    Imaging: EKG sinus tachycardia    Labs: Trop pending    GI/NUTR:     Diet, Regular     /RENAL:     acute right hydronephrosis 2/2 calculus-s/p right ureteral stent    Monitor UO  Labs pending         HEME/ONC:       DVT prophylaxis-LVX 40mg daily, SCDs     pending    ID:  WBC- 8.20  --->>,  5.36  --->>,  12.58  --->>  Temp trend- 24hrs T(F): 99.3 (02-18 @ 15:22), Max: 99.3 (02-18 @ 15:22)    Rocephin 1g qd  Cultures:      (collected 02-16 @ 15:07)  Source: Clean Catch Clean Catch (Midstream)  Preliminary Report:    >100,000 CFU/ml Escherichia coli    ENDO:     Glucose, Serum: 108 (02-18 @ 04:30)    LINES/DRAINS:  PIV,     Advanced Directives: Full Code       Indication: HYPOtension and tachycardia s/p cysto w/ right ureteral stent placement secondary to hydronephrosis in the setting on stones    DISPO:    SDU d/w Dr. Coburn  Assessment & Plan    36y Female HD#2 POD#0  s/p right cystoscopic right ureteral stent placement secondary to right mild hydronephrosis    NEURO:    Acute pain-controlled with acetaminophen, oxy prn    RESP:     Oxygenation-wean off NC to RA as tolerated    Activity-ambulate as tolerated         CARDS:     acute tachycardia-    Imaging: EKG sinus tachycardia    Labs: Trop pending    GI/NUTR:     Diet, Regular     /RENAL:     acute right hydronephrosis 2/2 calculus-s/p right ureteral stent    Monitor UO  Labs pending         HEME/ONC:       DVT prophylaxis-LVX 40mg daily, SCDs     pending    ID:  WBC- 8.20  --->>,  5.36  --->>,  12.58  --->>  Temp trend- 24hrs T(F): 99.3 (02-18 @ 15:22), Max: 99.3 (02-18 @ 15:22)    Rocephin 1g qd  Cultures:      (collected 02-16 @ 15:07)  Source: Clean Catch Clean Catch (Midstream)  Preliminary Report:    >100,000 CFU/ml Escherichia coli    ENDO:     Glucose, Serum: 108 (02-18 @ 04:30)    LINES/DRAINS:  PIV     Advanced Directives: Full Code     Indication: HYPOtension and tachycardia s/p cysto w/ right ureteral stent placement secondary to hydronephrosis in the setting on stones    DISPO:    SICU d/w Dr. Coburn

## 2022-02-18 NOTE — PRE-OP CHECKLIST - IDENTIFICATION BAND VERIFIED
done Complex Repair And A-T Advancement Flap Text: The defect edges were debeveled with a #15 scalpel blade.  The primary defect was closed partially with a complex linear closure.  Given the location of the remaining defect, shape of the defect and the proximity to free margins an A-T advancement flap was deemed most appropriate for complete closure of the defect.  Using a sterile surgical marker, an appropriate advancement flap was drawn incorporating the defect and placing the expected incisions within the relaxed skin tension lines where possible.    The area thus outlined was incised deep to adipose tissue with a #15 scalpel blade.  The skin margins were undermined to an appropriate distance in all directions utilizing iris scissors.

## 2022-02-18 NOTE — H&P ADULT - ASSESSMENT
# UVJ stone  - CT A/P which revealed a [id number] stone  - Pain control with Tylenol, percocet, ketorolac  - C/w flomax  - C/w IVF NS @ 100 cc/hr     DVT ppx: lovenox  GI ppx: Protonix   Diet: regular  Activity: IAT  Dispo: Admit to med    # UVJ stone  - CT A/P which revealed a [id number] stone  - Pain control with Tylenol, percocet, ketorolac  - C/w flomax and zofran   - C/w IVF NS @ 100 cc/hr  - Urology c/s appreciated: d/c on flomax   - F/u renal u/s   - F/u routine labs, ua, ucx    DVT ppx: lovenox  GI ppx: Protonix   Diet: regular  Activity: IAT  Dispo: Admit to med, likely d/c in 24-48 hrs

## 2022-02-18 NOTE — H&P ADULT - HISTORY OF PRESENT ILLNESS
35 yo F no PMHx presenting to ED for severe right-sided flank pain x 2days.     History goes back to yesterday when presented to the ED for same complaint, had CT A/P which revealed a [id number] stone at the UVJ with mild right hydronephrosis and a US pelvic showing b/l ovarian cysts. Patient received fluids and pain meds and was d/c from the ED with Flomax. Today she returns with R flank pain, n/v, inability to tolerate po and hold down meds. Endorses, dehydration, and intense pain of the R flank radiating to the R anterior abdomen.   In the ED, VS T: 99 HR: 86 BP: 88/51 RR: 17 SpO2: 98% in RA. Labs    35 yo F no PMHx presenting to ED for severe right-sided flank pain x 2days.     History goes back to yesterday when presented to the ED for same complaint, had CT A/P which revealed a [id number] stone at the UVJ with mild right hydronephrosis and a US pelvic showing b/l ovarian cysts. Patient received fluids and pain meds and was d/c from the ED with Flomax. Today she returns with R flank pain, n/v, inability to tolerate po and hold down meds. Endorses, dehydration, and intense pain of the R flank radiating to the R anterior abdomen.     In the ED, VS T: 99 HR: 86 BP: 88/51 RR: 17 SpO2: 98% in RA. Labs notable for Hb 10.7 (12.5 day before).     Admitted for pain control and hydration.    37 yo F no PMHx presenting to ED for severe right-sided flank pain x 2days.     History goes back to yesterday when presented to the ED for same complaint, had CT A/P which revealed a [id number] stone at the UVJ with mild right hydronephrosis and a US pelvic showing b/l ovarian cysts. Patient received fluids and pain meds and was d/c from the ED with Flomax. Today she returns with 7-10/10, R flank pain, nausea, inability to tolerate po and hold down meds. Endorses, dehydration and intense pain of the R flank radiating to the R anterior abdomen.     In the ED, VS T: 99 HR: 86 BP: 88/51 RR: 17 SpO2: 98% in RA. Labs notable for Hb 10.7 (12.5 day before).     Admitted for pain control and hydration.

## 2022-02-19 LAB
-  AMIKACIN: SIGNIFICANT CHANGE UP
-  AMOXICILLIN/CLAVULANIC ACID: SIGNIFICANT CHANGE UP
-  AMPICILLIN/SULBACTAM: SIGNIFICANT CHANGE UP
-  AMPICILLIN: SIGNIFICANT CHANGE UP
-  AZTREONAM: SIGNIFICANT CHANGE UP
-  CEFAZOLIN: SIGNIFICANT CHANGE UP
-  CEFEPIME: SIGNIFICANT CHANGE UP
-  CEFOXITIN: SIGNIFICANT CHANGE UP
-  CEFTRIAXONE: SIGNIFICANT CHANGE UP
-  CIPROFLOXACIN: SIGNIFICANT CHANGE UP
-  ERTAPENEM: SIGNIFICANT CHANGE UP
-  GENTAMICIN: SIGNIFICANT CHANGE UP
-  IMIPENEM: SIGNIFICANT CHANGE UP
-  LEVOFLOXACIN: SIGNIFICANT CHANGE UP
-  MEROPENEM: SIGNIFICANT CHANGE UP
-  NITROFURANTOIN: SIGNIFICANT CHANGE UP
-  PIPERACILLIN/TAZOBACTAM: SIGNIFICANT CHANGE UP
-  TIGECYCLINE: SIGNIFICANT CHANGE UP
-  TOBRAMYCIN: SIGNIFICANT CHANGE UP
-  TRIMETHOPRIM/SULFAMETHOXAZOLE: SIGNIFICANT CHANGE UP
ANION GAP SERPL CALC-SCNC: 10 MMOL/L — SIGNIFICANT CHANGE UP (ref 7–14)
ANION GAP SERPL CALC-SCNC: 11 MMOL/L — SIGNIFICANT CHANGE UP (ref 7–14)
BASOPHILS # BLD AUTO: 0.02 K/UL — SIGNIFICANT CHANGE UP (ref 0–0.2)
BASOPHILS # BLD AUTO: 0.03 K/UL — SIGNIFICANT CHANGE UP (ref 0–0.2)
BASOPHILS NFR BLD AUTO: 0.2 % — SIGNIFICANT CHANGE UP (ref 0–1)
BASOPHILS NFR BLD AUTO: 0.3 % — SIGNIFICANT CHANGE UP (ref 0–1)
BUN SERPL-MCNC: 14 MG/DL — SIGNIFICANT CHANGE UP (ref 10–20)
BUN SERPL-MCNC: 17 MG/DL — SIGNIFICANT CHANGE UP (ref 10–20)
CALCIUM SERPL-MCNC: 7.6 MG/DL — LOW (ref 8.5–10.1)
CALCIUM SERPL-MCNC: 8 MG/DL — LOW (ref 8.5–10.1)
CHLORIDE SERPL-SCNC: 106 MMOL/L — SIGNIFICANT CHANGE UP (ref 98–110)
CHLORIDE SERPL-SCNC: 107 MMOL/L — SIGNIFICANT CHANGE UP (ref 98–110)
CO2 SERPL-SCNC: 22 MMOL/L — SIGNIFICANT CHANGE UP (ref 17–32)
CO2 SERPL-SCNC: 24 MMOL/L — SIGNIFICANT CHANGE UP (ref 17–32)
CREAT SERPL-MCNC: 0.9 MG/DL — SIGNIFICANT CHANGE UP (ref 0.7–1.5)
CREAT SERPL-MCNC: 0.9 MG/DL — SIGNIFICANT CHANGE UP (ref 0.7–1.5)
CULTURE RESULTS: SIGNIFICANT CHANGE UP
EOSINOPHIL # BLD AUTO: 0 K/UL — SIGNIFICANT CHANGE UP (ref 0–0.7)
EOSINOPHIL # BLD AUTO: 0.05 K/UL — SIGNIFICANT CHANGE UP (ref 0–0.7)
EOSINOPHIL NFR BLD AUTO: 0 % — SIGNIFICANT CHANGE UP (ref 0–8)
EOSINOPHIL NFR BLD AUTO: 0.5 % — SIGNIFICANT CHANGE UP (ref 0–8)
GLUCOSE SERPL-MCNC: 102 MG/DL — HIGH (ref 70–99)
GLUCOSE SERPL-MCNC: 104 MG/DL — HIGH (ref 70–99)
HCT VFR BLD CALC: 26.2 % — LOW (ref 37–47)
HCT VFR BLD CALC: 27.2 % — LOW (ref 37–47)
HGB BLD-MCNC: 8.7 G/DL — LOW (ref 12–16)
HGB BLD-MCNC: 9.2 G/DL — LOW (ref 12–16)
IMM GRANULOCYTES NFR BLD AUTO: 0.3 % — SIGNIFICANT CHANGE UP (ref 0.1–0.3)
IMM GRANULOCYTES NFR BLD AUTO: 1.5 % — HIGH (ref 0.1–0.3)
LYMPHOCYTES # BLD AUTO: 0.43 K/UL — LOW (ref 1.2–3.4)
LYMPHOCYTES # BLD AUTO: 0.75 K/UL — LOW (ref 1.2–3.4)
LYMPHOCYTES # BLD AUTO: 3.8 % — LOW (ref 20.5–51.1)
LYMPHOCYTES # BLD AUTO: 7.8 % — LOW (ref 20.5–51.1)
MAGNESIUM SERPL-MCNC: 2.1 MG/DL — SIGNIFICANT CHANGE UP (ref 1.8–2.4)
MAGNESIUM SERPL-MCNC: 2.2 MG/DL — SIGNIFICANT CHANGE UP (ref 1.8–2.4)
MCHC RBC-ENTMCNC: 28.3 PG — SIGNIFICANT CHANGE UP (ref 27–31)
MCHC RBC-ENTMCNC: 28.8 PG — SIGNIFICANT CHANGE UP (ref 27–31)
MCHC RBC-ENTMCNC: 33.2 G/DL — SIGNIFICANT CHANGE UP (ref 32–37)
MCHC RBC-ENTMCNC: 33.8 G/DL — SIGNIFICANT CHANGE UP (ref 32–37)
MCV RBC AUTO: 85 FL — SIGNIFICANT CHANGE UP (ref 81–99)
MCV RBC AUTO: 85.3 FL — SIGNIFICANT CHANGE UP (ref 81–99)
METHOD TYPE: SIGNIFICANT CHANGE UP
MONOCYTES # BLD AUTO: 0.31 K/UL — SIGNIFICANT CHANGE UP (ref 0.1–0.6)
MONOCYTES # BLD AUTO: 0.32 K/UL — SIGNIFICANT CHANGE UP (ref 0.1–0.6)
MONOCYTES NFR BLD AUTO: 2.8 % — SIGNIFICANT CHANGE UP (ref 1.7–9.3)
MONOCYTES NFR BLD AUTO: 3.2 % — SIGNIFICANT CHANGE UP (ref 1.7–9.3)
NEUTROPHILS # BLD AUTO: 10.35 K/UL — HIGH (ref 1.4–6.5)
NEUTROPHILS # BLD AUTO: 8.44 K/UL — HIGH (ref 1.4–6.5)
NEUTROPHILS NFR BLD AUTO: 87.9 % — HIGH (ref 42.2–75.2)
NEUTROPHILS NFR BLD AUTO: 91.7 % — HIGH (ref 42.2–75.2)
NRBC # BLD: 0 /100 WBCS — SIGNIFICANT CHANGE UP (ref 0–0)
NRBC # BLD: 0 /100 WBCS — SIGNIFICANT CHANGE UP (ref 0–0)
ORGANISM # SPEC MICROSCOPIC CNT: SIGNIFICANT CHANGE UP
ORGANISM # SPEC MICROSCOPIC CNT: SIGNIFICANT CHANGE UP
PHOSPHATE SERPL-MCNC: 2.9 MG/DL — SIGNIFICANT CHANGE UP (ref 2.1–4.9)
PHOSPHATE SERPL-MCNC: 3.5 MG/DL — SIGNIFICANT CHANGE UP (ref 2.1–4.9)
PLATELET # BLD AUTO: 127 K/UL — LOW (ref 130–400)
PLATELET # BLD AUTO: 132 K/UL — SIGNIFICANT CHANGE UP (ref 130–400)
POTASSIUM SERPL-MCNC: 3.9 MMOL/L — SIGNIFICANT CHANGE UP (ref 3.5–5)
POTASSIUM SERPL-MCNC: 4.2 MMOL/L — SIGNIFICANT CHANGE UP (ref 3.5–5)
POTASSIUM SERPL-SCNC: 3.9 MMOL/L — SIGNIFICANT CHANGE UP (ref 3.5–5)
POTASSIUM SERPL-SCNC: 4.2 MMOL/L — SIGNIFICANT CHANGE UP (ref 3.5–5)
RBC # BLD: 3.07 M/UL — LOW (ref 4.2–5.4)
RBC # BLD: 3.2 M/UL — LOW (ref 4.2–5.4)
RBC # FLD: 14 % — SIGNIFICANT CHANGE UP (ref 11.5–14.5)
RBC # FLD: 14.1 % — SIGNIFICANT CHANGE UP (ref 11.5–14.5)
SODIUM SERPL-SCNC: 139 MMOL/L — SIGNIFICANT CHANGE UP (ref 135–146)
SODIUM SERPL-SCNC: 141 MMOL/L — SIGNIFICANT CHANGE UP (ref 135–146)
SPECIMEN SOURCE: SIGNIFICANT CHANGE UP
TROPONIN T SERPL-MCNC: 0.01 NG/ML — SIGNIFICANT CHANGE UP
TROPONIN T SERPL-MCNC: <0.01 NG/ML — SIGNIFICANT CHANGE UP
WBC # BLD: 11.29 K/UL — HIGH (ref 4.8–10.8)
WBC # BLD: 9.61 K/UL — SIGNIFICANT CHANGE UP (ref 4.8–10.8)
WBC # FLD AUTO: 11.29 K/UL — HIGH (ref 4.8–10.8)
WBC # FLD AUTO: 9.61 K/UL — SIGNIFICANT CHANGE UP (ref 4.8–10.8)

## 2022-02-19 PROCEDURE — 71045 X-RAY EXAM CHEST 1 VIEW: CPT | Mod: 26

## 2022-02-19 PROCEDURE — 93010 ELECTROCARDIOGRAM REPORT: CPT

## 2022-02-19 PROCEDURE — 99291 CRITICAL CARE FIRST HOUR: CPT

## 2022-02-19 RX ORDER — PIPERACILLIN AND TAZOBACTAM 4; .5 G/20ML; G/20ML
3.38 INJECTION, POWDER, LYOPHILIZED, FOR SOLUTION INTRAVENOUS ONCE
Refills: 0 | Status: COMPLETED | OUTPATIENT
Start: 2022-02-19 | End: 2022-02-19

## 2022-02-19 RX ORDER — PROCHLORPERAZINE MALEATE 5 MG
5 TABLET ORAL ONCE
Refills: 0 | Status: COMPLETED | OUTPATIENT
Start: 2022-02-19 | End: 2022-02-19

## 2022-02-19 RX ORDER — SODIUM CHLORIDE 9 MG/ML
1000 INJECTION, SOLUTION INTRAVENOUS
Refills: 0 | Status: DISCONTINUED | OUTPATIENT
Start: 2022-02-19 | End: 2022-02-20

## 2022-02-19 RX ORDER — POTASSIUM PHOSPHATE, MONOBASIC POTASSIUM PHOSPHATE, DIBASIC 236; 224 MG/ML; MG/ML
15 INJECTION, SOLUTION INTRAVENOUS ONCE
Refills: 0 | Status: COMPLETED | OUTPATIENT
Start: 2022-02-19 | End: 2022-02-19

## 2022-02-19 RX ORDER — PIPERACILLIN AND TAZOBACTAM 4; .5 G/20ML; G/20ML
3.38 INJECTION, POWDER, LYOPHILIZED, FOR SOLUTION INTRAVENOUS ONCE
Refills: 0 | Status: DISCONTINUED | OUTPATIENT
Start: 2022-02-19 | End: 2022-02-19

## 2022-02-19 RX ORDER — HYDROMORPHONE HYDROCHLORIDE 2 MG/ML
0.25 INJECTION INTRAMUSCULAR; INTRAVENOUS; SUBCUTANEOUS EVERY 6 HOURS
Refills: 0 | Status: DISCONTINUED | OUTPATIENT
Start: 2022-02-19 | End: 2022-02-19

## 2022-02-19 RX ORDER — PIPERACILLIN AND TAZOBACTAM 4; .5 G/20ML; G/20ML
3.38 INJECTION, POWDER, LYOPHILIZED, FOR SOLUTION INTRAVENOUS EVERY 8 HOURS
Refills: 0 | Status: DISCONTINUED | OUTPATIENT
Start: 2022-02-19 | End: 2022-02-21

## 2022-02-19 RX ORDER — PIPERACILLIN AND TAZOBACTAM 4; .5 G/20ML; G/20ML
3.38 INJECTION, POWDER, LYOPHILIZED, FOR SOLUTION INTRAVENOUS EVERY 8 HOURS
Refills: 0 | Status: DISCONTINUED | OUTPATIENT
Start: 2022-02-19 | End: 2022-02-19

## 2022-02-19 RX ORDER — HYDROMORPHONE HYDROCHLORIDE 2 MG/ML
0.25 INJECTION INTRAMUSCULAR; INTRAVENOUS; SUBCUTANEOUS ONCE
Refills: 0 | Status: DISCONTINUED | OUTPATIENT
Start: 2022-02-19 | End: 2022-02-19

## 2022-02-19 RX ORDER — ACETAMINOPHEN 500 MG
1000 TABLET ORAL ONCE
Refills: 0 | Status: COMPLETED | OUTPATIENT
Start: 2022-02-19 | End: 2022-02-19

## 2022-02-19 RX ORDER — PIPERACILLIN AND TAZOBACTAM 4; .5 G/20ML; G/20ML
INJECTION, POWDER, LYOPHILIZED, FOR SOLUTION INTRAVENOUS
Refills: 0 | Status: DISCONTINUED | OUTPATIENT
Start: 2022-02-19 | End: 2022-02-19

## 2022-02-19 RX ADMIN — Medication 400 MILLIGRAM(S): at 10:15

## 2022-02-19 RX ADMIN — METHOCARBAMOL 750 MILLIGRAM(S): 500 TABLET, FILM COATED ORAL at 13:29

## 2022-02-19 RX ADMIN — TAMSULOSIN HYDROCHLORIDE 0.4 MILLIGRAM(S): 0.4 CAPSULE ORAL at 22:14

## 2022-02-19 RX ADMIN — HYDROMORPHONE HYDROCHLORIDE 0.25 MILLIGRAM(S): 2 INJECTION INTRAMUSCULAR; INTRAVENOUS; SUBCUTANEOUS at 14:40

## 2022-02-19 RX ADMIN — Medication 650 MILLIGRAM(S): at 19:26

## 2022-02-19 RX ADMIN — OXYCODONE HYDROCHLORIDE 5 MILLIGRAM(S): 5 TABLET ORAL at 10:59

## 2022-02-19 RX ADMIN — PIPERACILLIN AND TAZOBACTAM 200 GRAM(S): 4; .5 INJECTION, POWDER, LYOPHILIZED, FOR SOLUTION INTRAVENOUS at 01:11

## 2022-02-19 RX ADMIN — SODIUM CHLORIDE 125 MILLILITER(S): 9 INJECTION, SOLUTION INTRAVENOUS at 11:36

## 2022-02-19 RX ADMIN — OXYCODONE HYDROCHLORIDE 5 MILLIGRAM(S): 5 TABLET ORAL at 17:53

## 2022-02-19 RX ADMIN — PIPERACILLIN AND TAZOBACTAM 25 GRAM(S): 4; .5 INJECTION, POWDER, LYOPHILIZED, FOR SOLUTION INTRAVENOUS at 22:11

## 2022-02-19 RX ADMIN — HYDROMORPHONE HYDROCHLORIDE 0.25 MILLIGRAM(S): 2 INJECTION INTRAMUSCULAR; INTRAVENOUS; SUBCUTANEOUS at 13:20

## 2022-02-19 RX ADMIN — SODIUM CHLORIDE 3000 MILLILITER(S): 9 INJECTION, SOLUTION INTRAVENOUS at 00:54

## 2022-02-19 RX ADMIN — ONDANSETRON 4 MILLIGRAM(S): 8 TABLET, FILM COATED ORAL at 10:15

## 2022-02-19 RX ADMIN — Medication 650 MILLIGRAM(S): at 18:15

## 2022-02-19 RX ADMIN — GABAPENTIN 100 MILLIGRAM(S): 400 CAPSULE ORAL at 22:10

## 2022-02-19 RX ADMIN — GABAPENTIN 100 MILLIGRAM(S): 400 CAPSULE ORAL at 16:14

## 2022-02-19 RX ADMIN — OXYCODONE HYDROCHLORIDE 5 MILLIGRAM(S): 5 TABLET ORAL at 10:15

## 2022-02-19 RX ADMIN — PIPERACILLIN AND TAZOBACTAM 25 GRAM(S): 4; .5 INJECTION, POWDER, LYOPHILIZED, FOR SOLUTION INTRAVENOUS at 05:42

## 2022-02-19 RX ADMIN — PIPERACILLIN AND TAZOBACTAM 25 GRAM(S): 4; .5 INJECTION, POWDER, LYOPHILIZED, FOR SOLUTION INTRAVENOUS at 16:14

## 2022-02-19 RX ADMIN — METHOCARBAMOL 750 MILLIGRAM(S): 500 TABLET, FILM COATED ORAL at 05:37

## 2022-02-19 RX ADMIN — METHOCARBAMOL 750 MILLIGRAM(S): 500 TABLET, FILM COATED ORAL at 18:15

## 2022-02-19 RX ADMIN — Medication 1000 MILLIGRAM(S): at 10:58

## 2022-02-19 RX ADMIN — Medication 5 MILLIGRAM(S): at 17:11

## 2022-02-19 RX ADMIN — SODIUM CHLORIDE 50 MILLILITER(S): 9 INJECTION, SOLUTION INTRAVENOUS at 16:17

## 2022-02-19 RX ADMIN — OXYCODONE HYDROCHLORIDE 5 MILLIGRAM(S): 5 TABLET ORAL at 17:12

## 2022-02-19 RX ADMIN — GABAPENTIN 100 MILLIGRAM(S): 400 CAPSULE ORAL at 05:38

## 2022-02-19 RX ADMIN — Medication 5 MILLIGRAM(S): at 11:36

## 2022-02-19 RX ADMIN — ENOXAPARIN SODIUM 40 MILLIGRAM(S): 100 INJECTION SUBCUTANEOUS at 13:29

## 2022-02-19 RX ADMIN — POTASSIUM PHOSPHATE, MONOBASIC POTASSIUM PHOSPHATE, DIBASIC 62.5 MILLIMOLE(S): 236; 224 INJECTION, SOLUTION INTRAVENOUS at 14:00

## 2022-02-19 NOTE — CONSULT NOTE ADULT - ASSESSMENT
ASSESSMENT  35 yo F no PMHx presenting to ED for severe right-sided flank pain x 2days with right UVJ stone and mild hydro     IMPRESSION  #Pyelonephritis with right uVJ stone/mild hydro  -  CT Abdomen and Pelvis w/ IV Cont (02.16.22 @ 15:19): Mild right hydroureteronephrosis secondary to a 3 x 2 x 3 mm calculus in  the region of the ureterovesicular junction.  - s/p cystoscopy with right ureteral sten 2/18 - purulent fluid noted by right kidney     #Abx allergy: NKDA    RECOMMENDATIONS  - continue zosyn 3.375 mg q 8 hours   - follow-up urine Cx and blood Cx   - trend WBC     This is a preliminary incomplete pended note, all final recommendations to follow after interview and examination of the patient.    Please call or message on [employer] Teams if with any questions.  Spectra [phone] ASSESSMENT  37 yo F no PMHx presenting to ED for severe right-sided flank pain x 2days with right UVJ stone and mild hydro     IMPRESSION  #Pyelonephritis with right UVJ stone/mild hydro  -  CT Abdomen and Pelvis w/ IV Cont (02.16.22 @ 15:19): Mild right hydroureteronephrosis secondary to a 3 x 2 x 3 mm calculus in  the region of the ureterovesicular junction.  - s/p cystoscopy with right ureteral sten 2/18 - purulent fluid noted by right kidney     #Abx allergy: NKDA    RECOMMENDATIONS  - continue zosyn 3.375 mg q 8 hours   - follow-up urine Cx and blood Cx -- if Urine Cx non-pseudomonas, can narrow to ceftriaxone 1g daily   - trend WBC and fever curve    Please call or message on [employer] Teams if with any questions.  Spectra [phone]

## 2022-02-19 NOTE — PROVIDER CONTACT NOTE (OTHER) - SITUATION
pt ambulated to bathroom; then c/o being flushed, nausea and pain; provided meds as ordered; now pt c/o bad headache and still has nausea

## 2022-02-19 NOTE — PROGRESS NOTE ADULT - ASSESSMENT
Pt is a 36y Female admitted with right UVJ stone, became hypotensive and tachycardic on the floor, urosepsis - s/p cystoscopy and right JJ stent placement, POD #1    ·	fluid challenged overnight with good response, no levophed required  ·	cont IV abx (Zosyn) as per ID, will f/u culture  ·	f/u rpt labs this AM  ·	f/u blood cultures  ·	cont to monitor UO q shift, no need for العراقي  ·	cont to trend Cr  ·	cont IVF  ·	PO diet as tolerated  ·	pt to remain with JJ stent in place, D/C home with JJ stent. Pt aware she will need outpatient follow up in a few weeks for definitive stone management  ·	cont care per SICU, possible plan to downgrade today to floor  ·	w/d with attng

## 2022-02-19 NOTE — PROGRESS NOTE ADULT - SUBJECTIVE AND OBJECTIVE BOX
UROLOGY DAILY PROGRESS NOTE    Pt is a 36y Female admitted with right UVJ stone, became hypotensive and tachycardic - s/p cystoscopy and right JJ stent placement, POD #1 - seen and examined at bedside. Pt doing well overall, still has some discomfort to the right side and right lower abdomen. Pt voiding freely overnight, + hematuria, no dysuria or pyuria. Pt has some water this AM, no nausea/vomiting.     MEDICATIONS  (STANDING):  acetaminophen     Tablet .. 650 milliGRAM(s) Oral every 6 hours  acetaminophen   IVPB .. 1000 milliGRAM(s) IV Intermittent once  enoxaparin Injectable 40 milliGRAM(s) SubCutaneous daily  gabapentin 100 milliGRAM(s) Oral three times a day  influenza   Vaccine 0.5 milliLiter(s) IntraMuscular once  lactated ringers. 1000 milliLiter(s) (125 mL/Hr) IV Continuous <Continuous>  methocarbamol 750 milliGRAM(s) Oral four times a day  piperacillin/tazobactam IVPB.. 3.375 Gram(s) IV Intermittent every 8 hours  tamsulosin 0.4 milliGRAM(s) Oral at bedtime    MEDICATIONS  (PRN):  ondansetron Injectable 4 milliGRAM(s) IV Push every 6 hours PRN Nausea and/or Vomiting  oxyCODONE    IR 5 milliGRAM(s) Oral every 4 hours PRN Moderate Pain (4 - 6)      REVIEW OF SYSTEMS   [x] A ten-point review of systems was otherwise negative except as noted.    Vital Signs Last 24 Hrs  T(C): 36.3 (2022 09:38), Max: 37.4 (2022 15:22)  T(F): 97.4 (2022 09:38), Max: 99.3 (2022 15:22)  HR: 75 (2022 09:38) (72 - 160)  BP: 92/59 (2022 09:38) (80/49 - 139/67)  BP(mean): 71 (2022 09:38) (60 - 76)  RR: 12 (2022 09:38) (12 - 29)  SpO2: 95% (2022 09:38) (94% - 99%)    PHYSICAL EXAM:    GEN: NAD, well-developed, awake and alert.  SKIN: Good color, non diaphoretic.  HEENT: NC/AT.  RESP: No dyspnea, non-labored breathing. No use of accessory muscles.  CARDIO: +S1/S2  ABDO: Soft, ND, no palpable bladder, + RLQ/suprapubic TTP.  BACK: right CVAT, no left CVAT.  : voiding freely      I&O's Summary    2022 07:01  -  2022 07:00  --------------------------------------------------------  IN: 3823 mL / OUT: 1000 mL / NET: 2823 mL      LABS:                        10.1   2.73  )-----------( 113      ( 2022 16:33 )             30.1         136  |  102  |  22<H>  ----------------------------<  92  3.7   |  22  |  1.2    Ca    8.0<L>      2022 16:33  Phos  1.8       Mg     1.3         TPro  5.0<L>  /  Alb  3.1<L>  /  TBili  0.7  /  DBili  x   /  AST  23  /  ALT  22  /  AlkPhos  44  02-18      Urinalysis Basic - ( 2022 10:26 )    Color: Emilia / Appearance: Slightly Turbid / S.023 / pH: x  Gluc: x / Ketone: Small  / Bili: Small / Urobili: 3 mg/dL   Blood: x / Protein: 30 mg/dL / Nitrite: Negative   Leuk Esterase: Large / RBC: x / WBC x   Sq Epi: x / Non Sq Epi: x / Bacteria: x  Culture - Urine (22 @ 15:07)   - Amikacin: S <=16   - Amoxicillin/Clavulanic Acid: S <=8/4   - Ampicillin: S <=8 These ampicillin results predict results for amoxicillin   - Ampicillin/Sulbactam: S <=4/2 Enterobacter, Klebsiella aerogenes, Citrobacter, and Serratia may develop resistance during prolonged therapy (3-4 days)   - Aztreonam: S <=4   - Cefazolin: S <=2 (MIC_CL_COM_ENTERIC_CEFAZU) For uncomplicated UTI with K. pneumoniae, E. coli, or P. mirablis: ANGIE <=16 is sensitive and ANGIE >=32 is resistant. This also predicts results for oral agents cefaclor, cefdinir, cefpodoxime, cefprozil, cefuroxime axetil, cephalexin and locarbef for uncomplicated UTI. Note that some isolates may be susceptible to these agents while testing resistant to cefazolin.   - Cefepime: S <=2   - Cefoxitin: S <=8   - Ceftriaxone: S <=1 Enterobacter, Klebsiella aerogenes, Citrobacter, and Serratia may develop resistance during prolonged therapy   - Ciprofloxacin: S <=0.25   - Ertapenem: S <=0.5   - Gentamicin: S <=2   - Imipenem: S <=1   - Levofloxacin: S <=0.5   - Meropenem: S <=1   - Nitrofurantoin: S <=32 Should not be used to treat pyelonephritis   - Piperacillin/Tazobactam: S <=8   - Tigecycline: S <=2   - Tobramycin: S <=2   - Trimethoprim/Sulfamethoxazole: S <=0.5/9.5   Specimen Source: Clean Catch Clean Catch (Midstream)   Culture Results:   >100,000 CFU/ml Escherichia coli   Organism Identification: Escherichia coli   Organism: Escherichia coli   Method Type: ANGIE

## 2022-02-19 NOTE — PROGRESS NOTE ADULT - SUBJECTIVE AND OBJECTIVE BOX
GILBERTO HESS  [id number]  36y Female    Indication for ICU admission: HYPOtension and tachycardia s/p cysto w/ right ureteral stent placement secondary to hydronephrosis in the setting on stones    Admit Date:02-18-22  ICU Date: 2/18  OR Date: 2/18    No Known Allergies    PAST MEDICAL & SURGICAL HISTORY:  None    Home Medications:  None      24HRS EVENT:  2/18  Night  - procal 14  - lactate 1.9  - switch dilaudid to oxycodone, add gabapentin and robaxin  - start flomax per urology   - NICOM negative   - switch to zosyn   - increase IVF to 125    2/18  Day   - admit to SICU  - trop 0.09, pending CE x2  - Mg, Phos repleted      DVT PTX: Lovenox    GI PTX:     ***Tubes/Lines/Drains  ***  [x] Peripheral IV      REVIEW OF SYSTEMS  [x ] A ten-point review of systems was otherwise negative except as noted.  [ ] Due to altered mental status/intubation, subjective information were not able to be obtained from the patient. History was obtained, to the extent possible, from review of the chart and collateral sources of information. GILBERTO HESS  [id number]  36y Female    Indication for ICU admission: HYPOtension and tachycardia s/p cysto w/ right ureteral stent placement secondary to hydronephrosis in the setting on stones    Admit Date:02-18-22  ICU Date: 2/18  OR Date: 2/18    No Known Allergies    PAST MEDICAL & SURGICAL HISTORY:  None    Home Medications:  None      24HRS EVENT:  2/18  Night  - procal 14  - lactate 1.9  - switch dilaudid to oxycodone, add gabapentin and robaxin  - start flomax per urology   - NICOM negative   - switch to zosyn   - increase IVF to 125    2/18  Day   - admit to SICU  - trop 0.09, pending CE x2  - Mg, Phos repleted      DVT PTX: Lovenox    GI PTX:     ***Tubes/Lines/Drains  ***  [x] Peripheral IV      REVIEW OF SYSTEMS  [x ] A ten-point review of systems was otherwise negative except as noted.  [ ] Due to altered mental status/intubation, subjective information were not able to be obtained from the patient. History was obtained, to the extent possible, from review of the chart and collateral sources of information.      Daily Height in cm: 170.18 (18 Feb 2022 15:22)    Daily     Diet, Regular (02-18-22 @ 15:45)      CURRENT MEDS:  Neurologic Medications  acetaminophen     Tablet .. 650 milliGRAM(s) Oral every 6 hours  gabapentin 100 milliGRAM(s) Oral three times a day  methocarbamol 750 milliGRAM(s) Oral four times a day  ondansetron Injectable 4 milliGRAM(s) IV Push every 6 hours PRN Nausea and/or Vomiting  oxyCODONE    IR 5 milliGRAM(s) Oral every 4 hours PRN Moderate Pain (4 - 6)    Respiratory Medications    Cardiovascular Medications  tamsulosin 0.4 milliGRAM(s) Oral at bedtime    Gastrointestinal Medications  lactated ringers. 1000 milliLiter(s) IV Continuous <Continuous>    Genitourinary Medications    Hematologic/Oncologic Medications  enoxaparin Injectable 40 milliGRAM(s) SubCutaneous daily  influenza   Vaccine 0.5 milliLiter(s) IntraMuscular once    Antimicrobial/Immunologic Medications  piperacillin/tazobactam IVPB.. 3.375 Gram(s) IV Intermittent every 8 hours    Endocrine/Metabolic Medications    Topical/Other Medications      ICU Vital Signs Last 24 Hrs  T(C): 36.6 (19 Feb 2022 00:33), Max: 37.4 (18 Feb 2022 15:22)  T(F): 97.8 (19 Feb 2022 00:33), Max: 99.3 (18 Feb 2022 15:22)  HR: 75 (19 Feb 2022 06:00) (72 - 160)  BP: 91/52 (19 Feb 2022 05:00) (80/49 - 139/67)  BP(mean): 66 (19 Feb 2022 05:00) (60 - 76)  ABP: --  ABP(mean): --  RR: 17 (19 Feb 2022 06:00) (13 - 29)  SpO2: 96% (19 Feb 2022 06:00) (95% - 99%)      Adult Advanced Hemodynamics Last 24 Hrs  CVP(mm Hg): --  CVP(cm H2O): --  CO: 10.1 (18 Feb 2022 17:30) (10.1 - 10.1)  CI: 5 (18 Feb 2022 17:30) (5 - 5)  PA: --  PA(mean): --  PCWP: --  SVR: --  SVRI: --  PVR: --  PVRI: --          I&O's Summary    18 Feb 2022 07:01  -  19 Feb 2022 07:00  --------------------------------------------------------  IN: 3823 mL / OUT: 1000 mL / NET: 2823 mL      I&O's Detail    18 Feb 2022 07:01  -  19 Feb 2022 07:00  --------------------------------------------------------  IN:    IV PiggyBack: 498 mL    IV PiggyBack: 250 mL    Lactated Ringers: 625 mL    Lactated Ringers: 250 mL    Lactated Ringers: 800 mL    Lactated Ringers Bolus: 250 mL    Oral Fluid: 150 mL    Sodium Chloride 0.9% Bolus: 1000 mL  Total IN: 3823 mL    OUT:    Voided (mL): 1000 mL  Total OUT: 1000 mL    Total NET: 2823 mL          PHYSICAL EXAM:     a&ox3  follows commands, OSBORNE  ambulating  no acute distress  equal chest rise b/l  abdomen soft, mild suprapubic tenderness upon palpation  extremities soft  no العراقي

## 2022-02-19 NOTE — PROGRESS NOTE ADULT - ASSESSMENT
Assessment & Plan  36y Female HD#2 POD#0  s/p right cystoscopic right ureteral stent placement secondary to right mild hydronephrosis    NEURO:    Acute pain-controlled with acetaminophen, oxy prn, gabapentin, robaxin    RESP:    Oxygenation- saturating well on RA    Activity-ambulate as tolerated   Encourage IS  f/u AM CXR        CARDS:     acute tachycardia and hypotension 2/2 urosepsis     - lactate 1.9 post op     - received 1,75 L IVF post op   - NICOM negative overnight    Imaging: EKG sinus tachycardia    Labs: Trop 0.09 --> f/u repeat    GI/NUTR:     Diet, Regular  IVL when tolerating diet      /RENAL:     acute right hydronephrosis 2/2 calculus-s/p right ureteral stent  - flomax per urology recommendations    Monitor UO- voiding  Labs pending         HEME/ONC:       DVT prophylaxis-LVX 40mg daily, SCDs  LR @ 125     pending    ID:  WBC- 8.20  --->>,  5.36  --->>,  12.58  --->>  Temp trend- 24hrs T(F): 99.3 (02-18 @ 15:22), Max: 99.3 (02-18 @ 15:22)  Zosyn 3.375 q8  Procalcitonin: 14  [ ] pending OR cultures  Cultures:      (collected 02-16 @ 15:07)  Source: Clean Catch Clean Catch (Midstream)  Preliminary Report:    >100,000 CFU/ml Escherichia coli    ENDO:     Glucose, Serum: 108 (02-18 @ 04:30)    LINES/DRAINS:  PIV,     Advanced Directives: Full Code    Indication: HYPOtension and tachycardia s/p cysto w/ right ureteral stent placement secondary to hydronephrosis in the setting on stones    DISPO:    SICU 36y Female HD#2 POD#1 s/p right cystoscopic right ureteral stent placement secondary to right mild hydronephrosis    NEURO:    Acute pain-controlled with acetaminophen, oxy prn, gabapentin, robaxin    RESP:    Oxygenation- saturating well on RA    Activity-ambulate as tolerated   Encourage IS  f/u AM CXR        CARDS:     acute tachycardia and hypotension 2/2 urosepsis     - lactate 1.9 post op     - received 2L IVF post op   - NICOM negative overnight    Imaging: EKG sinus tachycardia    Labs: Trop 0.09 --->0.01, f/u 3rd set    GI/NUTR:     Diet, Regular  IVL when tolerating diet      /RENAL:     acute right hydronephrosis 2/2 calculus-s/p right ureteral stent  - flomax per urology recommendations    Monitor UO- voiding  Labs pending         HEME/ONC:       DVT prophylaxis-LVX 40mg daily, SCDs  LR @ 125     pending    ID:  WBC- 8.20  --->>,  5.36  --->>,  12.58  --->>  Temp trend- 24hrs T(F): 99.3 (02-18 @ 15:22), Max: 99.3 (02-18 @ 15:22)  Zosyn 3.375 q8 course for 10 days, end date 3/1  Procalcitonin: 14  [ ] pending OR cultures  Cultures:    Ucx 2/16-E. Coli   ENDO:     Glucose, Serum: 108 (02-18 @ 04:30)    LINES/DRAINS:  PIV,     Advanced Directives: Full Code    Indication: HYPOtension and tachycardia s/p cysto w/ right ureteral stent placement secondary to hydronephrosis in the setting on stones    DISPO:   SICU

## 2022-02-19 NOTE — CONSULT NOTE ADULT - SUBJECTIVE AND OBJECTIVE BOX
JIMENA GILBERTO  36y, Female  Allergy: No Known Allergies      CHIEF COMPLAINT: Pain control (2022 00:32)      LOS  1d    HPI:  35 yo F no PMHx presenting to ED for severe right-sided flank pain x 2days.     History goes back to yesterday when presented to the ED for same complaint, had CT A/P which revealed a 2q2k6bw stone at the UVJ with mild right hydronephrosis and a US pelvic showing b/l ovarian cysts. Patient received fluids and pain meds and was d/c from the ED with Flomax. Today she returns with 7-10/10, R flank pain, nausea, inability to tolerate po and hold down meds. Endorses, dehydration and intense pain of the R flank radiating to the R anterior abdomen.     In the ED, VS T: 99 HR: 86 BP: 88/51 RR: 17 SpO2: 98% in RA. Labs notable for Hb 10.7 (12.5 day before).     Admitted for pain control and hydration.    (2022 02:46)      INFECTIOUS DISEASE HISTORY:  History as above.   CT abd/Pelvis with UVJ stone.  She is s/p cystoscopy with right ureteral stent - noted t ohave pus from right kidney     PAST MEDICAL & SURGICAL HISTORY:  None    FAMILY HISTORY  Reviewed and non-contributory    SOCIAL HISTORY  Social History:  Denies etoh use, drug use.       ROS  General: Denies rigors, nightsweats  HEENT: Denies headache, rhinorrhea, sore throat, eye pain  CV: Denies CP, palpitations  PULM: Denies wheezing, hemoptysis  GI: Denies hematemesis, hematochezia, melena  : Denies discharge, hematuria  MSK: Denies arthralgias, myalgias  SKIN: Denies rash, lesions  NEURO: Denies paresthesias, weakness  PSYCH: Denies depression, anxiety    VITALS:  T(F): 97.8, Max: 99.3 (22 @ 15:22)  HR: 75  BP: 91/52  RR: 17Vital Signs Last 24 Hrs  T(C): 36.6 (2022 00:33), Max: 37.4 (2022 15:22)  T(F): 97.8 (2022 00:33), Max: 99.3 (2022 15:22)  HR: 75 (2022 06:00) (72 - 160)  BP: 91/52 (2022 05:00) (78/41 - 139/67)  BP(mean): 66 (2022 05:00) (60 - 76)  RR: 17 (2022 06:00) (13 - 29)  SpO2: 96% (2022 06:00) (95% - 99%)    PHYSICAL EXAM:  Gen: NAD, resting in bed  HEENT: Normocephalic, atraumatic  Neck: supple, no lymphadenopathy  CV: Regular rate & regular rhythm  Lungs: decreased BS at bases, no fremitus  Abdomen: Soft, BS present  Ext: Warm, well perfused  Neuro: non focal, awake  Skin: no rash, no erythema  Lines: no phlebitis    TESTS & MEASUREMENTS:                        10.1   2.73  )-----------( 113      ( 2022 16:33 )             30.1     18    136  |  102  |  22<H>  ----------------------------<  92  3.7   |  22  |  1.2    Ca    8.0<L>      2022 16:33  Phos  1.8       Mg     1.3         TPro  5.0<L>  /  Alb  3.1<L>  /  TBili  0.7  /  DBili  x   /  AST  23  /  ALT  22  /  AlkPhos  44      eGFR if Non African American: 58 mL/min/1.73M2 (22 @ 16:33)  eGFR if : 67 mL/min/1.73M2 (22 @ 16:33)    LIVER FUNCTIONS - ( 2022 04:30 )  Alb: 3.1 g/dL / Pro: 5.0 g/dL / ALK PHOS: 44 U/L / ALT: 22 U/L / AST: 23 U/L / GGT: x           Urinalysis Basic - ( 2022 10:26 )    Color: Emilia / Appearance: Slightly Turbid / S.023 / pH: x  Gluc: x / Ketone: Small  / Bili: Small / Urobili: 3 mg/dL   Blood: x / Protein: 30 mg/dL / Nitrite: Negative   Leuk Esterase: Large / RBC: x / WBC x   Sq Epi: x / Non Sq Epi: x / Bacteria: x        Culture - Urine (collected 22 @ 15:07)  Source: Clean Catch Clean Catch (Midstream)  Preliminary Report (22 @ 11:27):    >100,000 CFU/ml Escherichia coli        Lactate, Blood: 1.9 mmol/L (22 @ 20:00)  Lactate, Blood: 2.0 mmol/L (22 @ 16:33)  Lactate, Blood: 1.3 mmol/L (22 @ 20:28)  Lactate, Blood: 1.3 mmol/L (22 @ 15:53)  Lactate, Blood: 4.5 mmol/L (22 @ 12:53)      INFECTIOUS DISEASES TESTING  Procalcitonin, Serum: 14.40 ng/mL (22 @ 04:30)  COVID-19 PCR: NotDetec (22 @ 20:28)      RADIOLOGY & ADDITIONAL TESTS:  I have personally reviewed the last Chest xray  CXR      CT  CT Abdomen and Pelvis w/ IV Cont:   ACC: [id number] EXAM:  CT ABDOMEN AND PELVIS IC                          PROCEDURE DATE:  2022          INTERPRETATION:  CLINICAL STATEMENT: Right groin pain    TECHNIQUE: Contiguous axial CT images were obtained from the lower chest   to the pubic symphysis following administration of 100cc Optiray 320   intravenous contrast.  Oral contrast was not administered.  Reformatted   images in the coronal and sagittal planes were acquired.    COMPARISON CT: None.    OTHER STUDIES USED FOR CORRELATION: None.      FINDINGS:    LOWER CHEST: Unremarkable.    HEPATOBILIARY: Unremarkable.    SPLEEN: Unremarkable.    PANCREAS: Unremarkable.    ADRENAL GLANDS: Unremarkable.    KIDNEYS: Delayed right nephrogram and mild right hydronephrosis with   perinephric/periureteral edema secondary to a 3 x 2 x 3 mm calculus in   the region of the ureterovesicular junction (4/279). Subcentimeter left   upper pole renal cyst.    ABDOMINOPELVIC NODES: No lymphadenopathy.    PELVIC ORGANS: Unremarkable.    PERITONEUM/MESENTERY/BOWEL: No bowel obstruction, ascites or   pneumoperitoneum. Normal appendix.    BONES/SOFT TISSUES: Unremarkable.    IMPRESSION:  Mild right hydroureteronephrosis secondary to a 3 x 2 x 3 mm calculus in   the region of the ureterovesicular junction.    --- End of Report ---            ANDREWS BILLINGSLEY MD; Attending Radiologist  This document has been electronically signed. 2022  3:39PM (22 @ 15:19)      CARDIOLOGY TESTING      MEDICATIONS  acetaminophen     Tablet .. 650 Oral every 6 hours  enoxaparin Injectable 40 SubCutaneous daily  gabapentin 100 Oral three times a day  influenza   Vaccine 0.5 IntraMuscular once  lactated ringers. 1000 IV Continuous <Continuous>  methocarbamol 750 Oral four times a day  piperacillin/tazobactam IVPB.. 3.375 IV Intermittent every 8 hours  tamsulosin 0.4 Oral at bedtime      Weight  Weight (kg): 59.9 (22 @ 15:22)    ANTIBIOTICS:  piperacillin/tazobactam IVPB.. 3.375 Gram(s) IV Intermittent every 8 hours      ALLERGIES:  No Known Allergies           SCOTTVICTORIANOGILBERTO GARCIA  36y, Female  Allergy: No Known Allergies      CHIEF COMPLAINT: Pain control (2022 00:32)      LOS  1d    HPI:  37 yo F no PMHx presenting to ED for severe right-sided flank pain x 2days.     History goes back to yesterday when presented to the ED for same complaint, had CT A/P which revealed a 8x6a5rs stone at the UVJ with mild right hydronephrosis and a US pelvic showing b/l ovarian cysts. Patient received fluids and pain meds and was d/c from the ED with Flomax. Today she returns with 7-10/10, R flank pain, nausea, inability to tolerate po and hold down meds. Endorses, dehydration and intense pain of the R flank radiating to the R anterior abdomen.     In the ED, VS T: 99 HR: 86 BP: 88/51 RR: 17 SpO2: 98% in RA. Labs notable for Hb 10.7 (12.5 day before).     Admitted for pain control and hydration.    (2022 02:46)      INFECTIOUS DISEASE HISTORY:  History as above.   CT abd/Pelvis with UVJ stone.  She is s/p cystoscopy with right ureteral stent - noted t ohave pus from right kidney   Right flank pain improving after cysto  She is no longer having dysuria.     PAST MEDICAL & SURGICAL HISTORY:  None    FAMILY HISTORY  Reviewed and non-contributory    SOCIAL HISTORY  Social History:  Denies etoh use, drug use.       ROS  General: Denies rigors, nightsweats  HEENT: Denies headache, rhinorrhea, sore throat, eye pain  CV: Denies CP, palpitations  PULM: Denies wheezing, hemoptysis  GI: Denies hematemesis, hematochezia, melena  : Denies discharge, hematuria  MSK: Denies arthralgias, myalgias  SKIN: Denies rash, lesions  NEURO: Denies paresthesias, weakness  PSYCH: Denies depression, anxiety    VITALS:  T(F): 97.8, Max: 99.3 (22 @ 15:22)  HR: 75  BP: 91/52  RR: 17Vital Signs Last 24 Hrs  T(C): 36.6 (2022 00:33), Max: 37.4 (2022 15:22)  T(F): 97.8 (2022 00:33), Max: 99.3 (2022 15:22)  HR: 75 (2022 06:00) (72 - 160)  BP: 91/52 (2022 05:00) (78/41 - 139/67)  BP(mean): 66 (2022 05:00) (60 - 76)  RR: 17 (2022 06:00) (13 - 29)  SpO2: 96% (2022 06:00) (95% - 99%)    PHYSICAL EXAM:  Gen: NAD, resting in bed  HEENT: Normocephalic, atraumatic  Neck: supple, no lymphadenopathy  CV: Regular rate & regular rhythm  Lungs: decreased BS at bases, no fremitus  Abdomen: Soft, BS present  Ext: Warm, well perfused  Neuro: non focal, awake  Skin: no rash, no erythema  Lines: no phlebitis    TESTS & MEASUREMENTS:                        10.1   2.73  )-----------( 113      ( 2022 16:33 )             30.1     0218    136  |  102  |  22<H>  ----------------------------<  92  3.7   |  22  |  1.2    Ca    8.0<L>      2022 16:33  Phos  1.8       Mg     1.3         TPro  5.0<L>  /  Alb  3.1<L>  /  TBili  0.7  /  DBili  x   /  AST  23  /  ALT  22  /  AlkPhos  44  18    eGFR if Non African American: 58 mL/min/1.73M2 (22 @ 16:33)  eGFR if : 67 mL/min/1.73M2 (22 @ 16:33)    LIVER FUNCTIONS - ( 2022 04:30 )  Alb: 3.1 g/dL / Pro: 5.0 g/dL / ALK PHOS: 44 U/L / ALT: 22 U/L / AST: 23 U/L / GGT: x           Urinalysis Basic - ( 2022 10:26 )    Color: Emilia / Appearance: Slightly Turbid / S.023 / pH: x  Gluc: x / Ketone: Small  / Bili: Small / Urobili: 3 mg/dL   Blood: x / Protein: 30 mg/dL / Nitrite: Negative   Leuk Esterase: Large / RBC: x / WBC x   Sq Epi: x / Non Sq Epi: x / Bacteria: x        Culture - Urine (collected 22 @ 15:07)  Source: Clean Catch Clean Catch (Midstream)  Preliminary Report (22 @ 11:27):    >100,000 CFU/ml Escherichia coli        Lactate, Blood: 1.9 mmol/L (22 @ 20:00)  Lactate, Blood: 2.0 mmol/L (22 @ 16:33)  Lactate, Blood: 1.3 mmol/L (22 @ 20:28)  Lactate, Blood: 1.3 mmol/L (22 @ 15:53)  Lactate, Blood: 4.5 mmol/L (22 @ 12:53)      INFECTIOUS DISEASES TESTING  Procalcitonin, Serum: 14.40 ng/mL (22 @ 04:30)  COVID-19 PCR: NotDetec (22 @ 20:28)      RADIOLOGY & ADDITIONAL TESTS:  I have personally reviewed the last Chest xray  CXR      CT  CT Abdomen and Pelvis w/ IV Cont:   ACC: [id number] EXAM:  CT ABDOMEN AND PELVIS IC                          PROCEDURE DATE:  2022          INTERPRETATION:  CLINICAL STATEMENT: Right groin pain    TECHNIQUE: Contiguous axial CT images were obtained from the lower chest   to the pubic symphysis following administration of 100cc Optiray 320   intravenous contrast.  Oral contrast was not administered.  Reformatted   images in the coronal and sagittal planes were acquired.    COMPARISON CT: None.    OTHER STUDIES USED FOR CORRELATION: None.      FINDINGS:    LOWER CHEST: Unremarkable.    HEPATOBILIARY: Unremarkable.    SPLEEN: Unremarkable.    PANCREAS: Unremarkable.    ADRENAL GLANDS: Unremarkable.    KIDNEYS: Delayed right nephrogram and mild right hydronephrosis with   perinephric/periureteral edema secondary to a 3 x 2 x 3 mm calculus in   the region of the ureterovesicular junction (4/279). Subcentimeter left   upper pole renal cyst.    ABDOMINOPELVIC NODES: No lymphadenopathy.    PELVIC ORGANS: Unremarkable.    PERITONEUM/MESENTERY/BOWEL: No bowel obstruction, ascites or   pneumoperitoneum. Normal appendix.    BONES/SOFT TISSUES: Unremarkable.    IMPRESSION:  Mild right hydroureteronephrosis secondary to a 3 x 2 x 3 mm calculus in   the region of the ureterovesicular junction.    --- End of Report ---            ANDREWS BILLINGSLEY MD; Attending Radiologist  This document has been electronically signed. 2022  3:39PM (22 @ 15:19)      CARDIOLOGY TESTING      MEDICATIONS  acetaminophen     Tablet .. 650 Oral every 6 hours  enoxaparin Injectable 40 SubCutaneous daily  gabapentin 100 Oral three times a day  influenza   Vaccine 0.5 IntraMuscular once  lactated ringers. 1000 IV Continuous <Continuous>  methocarbamol 750 Oral four times a day  piperacillin/tazobactam IVPB.. 3.375 IV Intermittent every 8 hours  tamsulosin 0.4 Oral at bedtime      Weight  Weight (kg): 59.9 (22 @ 15:22)    ANTIBIOTICS:  piperacillin/tazobactam IVPB.. 3.375 Gram(s) IV Intermittent every 8 hours      ALLERGIES:  No Known Allergies

## 2022-02-20 LAB
ANION GAP SERPL CALC-SCNC: 14 MMOL/L — SIGNIFICANT CHANGE UP (ref 7–14)
ANION GAP SERPL CALC-SCNC: 16 MMOL/L — HIGH (ref 7–14)
BASOPHILS # BLD AUTO: 0.03 K/UL — SIGNIFICANT CHANGE UP (ref 0–0.2)
BASOPHILS NFR BLD AUTO: 0.4 % — SIGNIFICANT CHANGE UP (ref 0–1)
BUN SERPL-MCNC: 13 MG/DL — SIGNIFICANT CHANGE UP (ref 10–20)
BUN SERPL-MCNC: 15 MG/DL — SIGNIFICANT CHANGE UP (ref 10–20)
CALCIUM SERPL-MCNC: 8.3 MG/DL — LOW (ref 8.5–10.1)
CALCIUM SERPL-MCNC: 8.6 MG/DL — SIGNIFICANT CHANGE UP (ref 8.5–10.1)
CHLORIDE SERPL-SCNC: 100 MMOL/L — SIGNIFICANT CHANGE UP (ref 98–110)
CHLORIDE SERPL-SCNC: 98 MMOL/L — SIGNIFICANT CHANGE UP (ref 98–110)
CO2 SERPL-SCNC: 24 MMOL/L — SIGNIFICANT CHANGE UP (ref 17–32)
CO2 SERPL-SCNC: 25 MMOL/L — SIGNIFICANT CHANGE UP (ref 17–32)
CREAT SERPL-MCNC: 0.8 MG/DL — SIGNIFICANT CHANGE UP (ref 0.7–1.5)
CREAT SERPL-MCNC: 0.8 MG/DL — SIGNIFICANT CHANGE UP (ref 0.7–1.5)
EOSINOPHIL # BLD AUTO: 0.13 K/UL — SIGNIFICANT CHANGE UP (ref 0–0.7)
EOSINOPHIL NFR BLD AUTO: 1.7 % — SIGNIFICANT CHANGE UP (ref 0–8)
GLUCOSE SERPL-MCNC: 89 MG/DL — SIGNIFICANT CHANGE UP (ref 70–99)
GLUCOSE SERPL-MCNC: 91 MG/DL — SIGNIFICANT CHANGE UP (ref 70–99)
HCT VFR BLD CALC: 28.6 % — LOW (ref 37–47)
HGB BLD-MCNC: 9.7 G/DL — LOW (ref 12–16)
IMM GRANULOCYTES NFR BLD AUTO: 0.5 % — HIGH (ref 0.1–0.3)
LYMPHOCYTES # BLD AUTO: 1.54 K/UL — SIGNIFICANT CHANGE UP (ref 1.2–3.4)
LYMPHOCYTES # BLD AUTO: 20.4 % — LOW (ref 20.5–51.1)
MAGNESIUM SERPL-MCNC: 1.6 MG/DL — LOW (ref 1.8–2.4)
MAGNESIUM SERPL-MCNC: 1.6 MG/DL — LOW (ref 1.8–2.4)
MCHC RBC-ENTMCNC: 28.7 PG — SIGNIFICANT CHANGE UP (ref 27–31)
MCHC RBC-ENTMCNC: 33.9 G/DL — SIGNIFICANT CHANGE UP (ref 32–37)
MCV RBC AUTO: 84.6 FL — SIGNIFICANT CHANGE UP (ref 81–99)
MONOCYTES # BLD AUTO: 0.5 K/UL — SIGNIFICANT CHANGE UP (ref 0.1–0.6)
MONOCYTES NFR BLD AUTO: 6.6 % — SIGNIFICANT CHANGE UP (ref 1.7–9.3)
NEUTROPHILS # BLD AUTO: 5.31 K/UL — SIGNIFICANT CHANGE UP (ref 1.4–6.5)
NEUTROPHILS NFR BLD AUTO: 70.4 % — SIGNIFICANT CHANGE UP (ref 42.2–75.2)
NRBC # BLD: 0 /100 WBCS — SIGNIFICANT CHANGE UP (ref 0–0)
PHOSPHATE SERPL-MCNC: 2.7 MG/DL — SIGNIFICANT CHANGE UP (ref 2.1–4.9)
PHOSPHATE SERPL-MCNC: 2.9 MG/DL — SIGNIFICANT CHANGE UP (ref 2.1–4.9)
PLATELET # BLD AUTO: 142 K/UL — SIGNIFICANT CHANGE UP (ref 130–400)
POTASSIUM SERPL-MCNC: 3.2 MMOL/L — LOW (ref 3.5–5)
POTASSIUM SERPL-MCNC: 3.3 MMOL/L — LOW (ref 3.5–5)
POTASSIUM SERPL-SCNC: 3.2 MMOL/L — LOW (ref 3.5–5)
POTASSIUM SERPL-SCNC: 3.3 MMOL/L — LOW (ref 3.5–5)
RBC # BLD: 3.38 M/UL — LOW (ref 4.2–5.4)
RBC # FLD: 14 % — SIGNIFICANT CHANGE UP (ref 11.5–14.5)
SODIUM SERPL-SCNC: 138 MMOL/L — SIGNIFICANT CHANGE UP (ref 135–146)
SODIUM SERPL-SCNC: 139 MMOL/L — SIGNIFICANT CHANGE UP (ref 135–146)
WBC # BLD: 7.55 K/UL — SIGNIFICANT CHANGE UP (ref 4.8–10.8)
WBC # FLD AUTO: 7.55 K/UL — SIGNIFICANT CHANGE UP (ref 4.8–10.8)

## 2022-02-20 PROCEDURE — 71045 X-RAY EXAM CHEST 1 VIEW: CPT | Mod: 26

## 2022-02-20 PROCEDURE — 99291 CRITICAL CARE FIRST HOUR: CPT

## 2022-02-20 RX ORDER — ACETAMINOPHEN 500 MG
650 TABLET ORAL EVERY 6 HOURS
Refills: 0 | Status: DISCONTINUED | OUTPATIENT
Start: 2022-02-20 | End: 2022-02-21

## 2022-02-20 RX ORDER — POLYETHYLENE GLYCOL 3350 17 G/17G
17 POWDER, FOR SOLUTION ORAL ONCE
Refills: 0 | Status: COMPLETED | OUTPATIENT
Start: 2022-02-20 | End: 2022-02-20

## 2022-02-20 RX ORDER — FUROSEMIDE 40 MG
20 TABLET ORAL ONCE
Refills: 0 | Status: COMPLETED | OUTPATIENT
Start: 2022-02-20 | End: 2022-02-20

## 2022-02-20 RX ORDER — POLYETHYLENE GLYCOL 3350 17 G/17G
17 POWDER, FOR SOLUTION ORAL DAILY
Refills: 0 | Status: DISCONTINUED | OUTPATIENT
Start: 2022-02-21 | End: 2022-02-21

## 2022-02-20 RX ORDER — PROCHLORPERAZINE MALEATE 5 MG
5 TABLET ORAL ONCE
Refills: 0 | Status: COMPLETED | OUTPATIENT
Start: 2022-02-20 | End: 2022-02-20

## 2022-02-20 RX ORDER — PROCHLORPERAZINE MALEATE 5 MG
10 TABLET ORAL ONCE
Refills: 0 | Status: COMPLETED | OUTPATIENT
Start: 2022-02-20 | End: 2022-02-20

## 2022-02-20 RX ADMIN — OXYCODONE HYDROCHLORIDE 5 MILLIGRAM(S): 5 TABLET ORAL at 16:14

## 2022-02-20 RX ADMIN — OXYCODONE HYDROCHLORIDE 5 MILLIGRAM(S): 5 TABLET ORAL at 11:54

## 2022-02-20 RX ADMIN — Medication 650 MILLIGRAM(S): at 23:17

## 2022-02-20 RX ADMIN — ONDANSETRON 4 MILLIGRAM(S): 8 TABLET, FILM COATED ORAL at 17:14

## 2022-02-20 RX ADMIN — Medication 650 MILLIGRAM(S): at 05:31

## 2022-02-20 RX ADMIN — Medication 20 MILLIGRAM(S): at 09:40

## 2022-02-20 RX ADMIN — Medication 5 MILLIGRAM(S): at 11:30

## 2022-02-20 RX ADMIN — METHOCARBAMOL 750 MILLIGRAM(S): 500 TABLET, FILM COATED ORAL at 11:30

## 2022-02-20 RX ADMIN — METHOCARBAMOL 750 MILLIGRAM(S): 500 TABLET, FILM COATED ORAL at 17:08

## 2022-02-20 RX ADMIN — POLYETHYLENE GLYCOL 3350 17 GRAM(S): 17 POWDER, FOR SOLUTION ORAL at 09:40

## 2022-02-20 RX ADMIN — GABAPENTIN 100 MILLIGRAM(S): 400 CAPSULE ORAL at 16:06

## 2022-02-20 RX ADMIN — Medication 650 MILLIGRAM(S): at 11:56

## 2022-02-20 RX ADMIN — OXYCODONE HYDROCHLORIDE 5 MILLIGRAM(S): 5 TABLET ORAL at 11:24

## 2022-02-20 RX ADMIN — Medication 650 MILLIGRAM(S): at 00:30

## 2022-02-20 RX ADMIN — Medication 104 MILLIGRAM(S): at 21:56

## 2022-02-20 RX ADMIN — Medication 650 MILLIGRAM(S): at 11:26

## 2022-02-20 RX ADMIN — Medication 650 MILLIGRAM(S): at 05:29

## 2022-02-20 RX ADMIN — PIPERACILLIN AND TAZOBACTAM 25 GRAM(S): 4; .5 INJECTION, POWDER, LYOPHILIZED, FOR SOLUTION INTRAVENOUS at 05:30

## 2022-02-20 RX ADMIN — ONDANSETRON 4 MILLIGRAM(S): 8 TABLET, FILM COATED ORAL at 06:11

## 2022-02-20 RX ADMIN — GABAPENTIN 100 MILLIGRAM(S): 400 CAPSULE ORAL at 23:17

## 2022-02-20 RX ADMIN — Medication 650 MILLIGRAM(S): at 17:09

## 2022-02-20 RX ADMIN — ENOXAPARIN SODIUM 40 MILLIGRAM(S): 100 INJECTION SUBCUTANEOUS at 11:27

## 2022-02-20 RX ADMIN — METHOCARBAMOL 750 MILLIGRAM(S): 500 TABLET, FILM COATED ORAL at 00:02

## 2022-02-20 RX ADMIN — METHOCARBAMOL 750 MILLIGRAM(S): 500 TABLET, FILM COATED ORAL at 23:17

## 2022-02-20 RX ADMIN — METHOCARBAMOL 750 MILLIGRAM(S): 500 TABLET, FILM COATED ORAL at 05:29

## 2022-02-20 RX ADMIN — TAMSULOSIN HYDROCHLORIDE 0.4 MILLIGRAM(S): 0.4 CAPSULE ORAL at 23:16

## 2022-02-20 RX ADMIN — GABAPENTIN 100 MILLIGRAM(S): 400 CAPSULE ORAL at 05:29

## 2022-02-20 RX ADMIN — PIPERACILLIN AND TAZOBACTAM 25 GRAM(S): 4; .5 INJECTION, POWDER, LYOPHILIZED, FOR SOLUTION INTRAVENOUS at 16:07

## 2022-02-20 RX ADMIN — Medication 650 MILLIGRAM(S): at 00:01

## 2022-02-20 RX ADMIN — PIPERACILLIN AND TAZOBACTAM 25 GRAM(S): 4; .5 INJECTION, POWDER, LYOPHILIZED, FOR SOLUTION INTRAVENOUS at 23:16

## 2022-02-20 RX ADMIN — OXYCODONE HYDROCHLORIDE 5 MILLIGRAM(S): 5 TABLET ORAL at 16:44

## 2022-02-20 NOTE — CHART NOTE - NSCHARTNOTEFT_GEN_A_CORE
SICU Transfer Note    GILBERTO HESS  36y (1985)  [id number]      Transfer from: SICU      SICU COURSE:  36y Female HD# 2d, PMHx presenting to ED for severe right-sided flank pain x 2days.     History goes back to yesterday when presented to the ED for same complaint, had CT A/P which revealed a [id number] stone at the UVJ with mild right hydronephrosis and a US pelvic showing b/l ovarian cysts. Patient received fluids and pain meds and was d/c from the ED with Flomax. Today she returns with 7-10/10, R flank pain, nausea, inability to tolerate po and hold down meds. Endorses, dehydration and intense pain of the R flank radiating to the R anterior abdomen.     In the ED, VS T: 99 HR: 86 BP: 88/51 RR: 17 SpO2: 98% in RA. Labs notable for Hb 10.7 (12.5 day before).     Admitted for pain control and hydration.       s/p Cystoscopic insertion of ureteral stent.           MEDICATIONS  (STANDING):  acetaminophen     Tablet .. 650 milliGRAM(s) Oral every 6 hours  enoxaparin Injectable 40 milliGRAM(s) SubCutaneous daily  gabapentin 100 milliGRAM(s) Oral three times a day  methocarbamol 750 milliGRAM(s) Oral four times a day  piperacillin/tazobactam IVPB.. 3.375 Gram(s) IV Intermittent every 8 hours  prochlorperazine   IVPB 10 milliGRAM(s) IV Intermittent once  tamsulosin 0.4 milliGRAM(s) Oral at bedtime    MEDICATIONS  (PRN):  ondansetron Injectable 4 milliGRAM(s) IV Push every 6 hours PRN Nausea and/or Vomiting  oxyCODONE    IR 5 milliGRAM(s) Oral every 4 hours PRN Moderate Pain (4 - 6)      Vital Signs Last 24 Hrs  T(C): 36.7 (20 Feb 2022 08:00), Max: 36.9 (20 Feb 2022 00:00)  T(F): 98 (20 Feb 2022 08:00), Max: 98.5 (20 Feb 2022 04:00)  HR: 63 (20 Feb 2022 20:00) (62 - 97)  BP: 102/71 (20 Feb 2022 20:00) (89/52 - 114/72)  BP(mean): 83 (20 Feb 2022 20:00) (67 - 93)  RR: 15 (20 Feb 2022 20:00) (15 - 32)  SpO2: 99% (20 Feb 2022 20:00) (92% - 99%)  I&O's Summary    19 Feb 2022 07:01  -  20 Feb 2022 07:00  --------------------------------------------------------  IN: 2025 mL / OUT: 1850 mL / NET: 175 mL    20 Feb 2022 07:01  -  20 Feb 2022 21:15  --------------------------------------------------------  IN: 0 mL / OUT: 3970 mL / NET: -3970 mL        LABS  LABS:                        8.7    9.61  )-----------( 127      ( 19 Feb 2022 20:00 )             26.2       02-20    138  |  98  |  15  ----------------------------<  91  3.2<L>   |  24  |  0.8    Ca    8.6      20 Feb 2022 16:00  Phos  2.7     02-20  Mg     1.6     02-20          CARDIAC MARKERS ( 19 Feb 2022 11:00 )  x     / <0.01 ng/mL / x     / x     / x      CARDIAC MARKERS ( 18 Feb 2022 23:30 )  x     / 0.01 ng/mL / x     / x     / x          bnp        Culture - Blood (collected 18 Feb 2022 23:30)  Source: .Blood Blood-Venous  Preliminary Report (20 Feb 2022 09:01):    No growth to date.    Culture - Urine (collected 18 Feb 2022 16:00)  Source: .Urine None  Final Report (20 Feb 2022 20:18):    50,000 - 99,000 CFU/mL Escherichia coli  Organism: Escherichia coli (20 Feb 2022 20:18)  Organism: Escherichia coli (20 Feb 2022 20:18)        Assessment & Plan:    36y Female HD#2 POD#0  s/p right cystoscopic right ureteral stent placement secondary to right mild hydronephrosis    NEURO:    Acute pain-controlled with acetaminophen, oxy prn, gabapentin, robaxin    RESP:    Oxygenation- up to 4L NC, O2 sat 94-95%   Activity-ambulate as tolerated   Encourage IS  - AM CXR mildly congested  - s/p lasix, +net negative 3900ml  -f/u cxr    CARDS:     acute tachycardia and hypotension 2/2 Sepsis, resolved    - lactate 1.9 post op     - received 2L IVF post op     Imaging: EKG sinus tachycardia    Labs: Trop 0.09 --> 0.01 x 2, no longer trending     GI/NUTR:     Diet, Regular  - IVL   - Zofran PRN, intermittent nausea    /RENAL:   - acute right hydronephrosis 2/2 calculus-s/p right ureteral stent  - flomax per urology recommendations      Labs:          BUN/Cr- 14/0.9  -->,  17/0.9  -->          Electrolytes-Na 139 // K 4.2 // Mg 2.1 //  Phos 3.5 (02-19 @ 20:00)        HEME/ONC:     DVT prophylaxis-LVX 40mg daily, SCDs    Labs: Hb/Hct:  9.2/27.2  -->,  8.7/26.2  -->                      Plts:  132  -->,  127  -->                 PTT/INR:        ID:  WBC- 2.73  --->>,  11.29  --->>,  9.61  --->>  Temp trend- 24hrs T(F): 98.5 (02-19 @ 20:00), Max: 98.5 (02-19 @ 20:00)  Antibiotics-piperacillin/tazobactam IVPB.. 3.375 every 8 hours    Cultures:      (collected 02-18 @ 16:00)  Source: .Urine None  Preliminary Report:    50,000 - 99,000 CFU/mL Escherichia coli     (collected 02-16 @ 15:07)  Source: Clean Catch Clean Catch (Midstream)  Final Report:    >100,000 CFU/ml Escherichia coli  Organism: Escherichia coli        ENDO:     No active Issues    LINES/DRAINS:  PIV     Advanced Directives: Full Code    Indication: HYPOtension and tachycardia s/p cysto w/ right ureteral stent placement secondary to hydronephrosis in the setting of stones    DISPO:    floor per [facility]        Follow Up:  -2000 labs        Signed out to:  Date:  Time: SICU Transfer Note    GILBERTO HESS  36y (1985)  [id number]      Transfer from: SICU      SICU COURSE:  36y Female HD# 2d, PMHx presenting to ED for severe right-sided flank pain x 2days.     History goes back to yesterday when presented to the ED for same complaint, had CT A/P which revealed a [id number] stone at the UVJ with mild right hydronephrosis and a US pelvic showing b/l ovarian cysts. Patient received fluids and pain meds and was d/c from the ED with Flomax. Today she returns with 7-10/10, R flank pain, nausea, inability to tolerate po and hold down meds. Endorses, dehydration and intense pain of the R flank radiating to the R anterior abdomen.     In the ED, VS T: 99 HR: 86 BP: 88/51 RR: 17 SpO2: 98% in RA. Labs notable for Hb 10.7 (12.5 day before).     Admitted for pain control and hydration.       s/p Cystoscopic insertion of ureteral stent.     2/21  Night  Compazine x 1  Downgrade       2/20  Day  - Miralax started  - Lasix IV 20 x1 --> 2.9L out  - Metolazone 2.5 x1  -transitioned from NC to RA  -1600 BMP pending    2/19  Night  pain controlled   no repletions  d/c IVF  increasing NC requirements    2/19  Day  - Nausea- Zofran, still nauseous- compazine and EKG-     2/18  Night  - procal 14  - lactate 1.9  - switch dilaudid to oxycodone, add gabapentin and robaxin  - start flomax per urology   - NICOM negative, BP clinically improved, additional 750cc given  - switch to zosyn   - increase IVF to 125  - trop 0.09 --> neg  - ID: continue zosyn    2/18  Day   - admit to SICU  - trop 0.09, pending CE x2  - Mg, Phos repleted        MEDICATIONS  (STANDING):  acetaminophen     Tablet .. 650 milliGRAM(s) Oral every 6 hours  enoxaparin Injectable 40 milliGRAM(s) SubCutaneous daily  gabapentin 100 milliGRAM(s) Oral three times a day  methocarbamol 750 milliGRAM(s) Oral four times a day  piperacillin/tazobactam IVPB.. 3.375 Gram(s) IV Intermittent every 8 hours  prochlorperazine   IVPB 10 milliGRAM(s) IV Intermittent once  tamsulosin 0.4 milliGRAM(s) Oral at bedtime    MEDICATIONS  (PRN):  ondansetron Injectable 4 milliGRAM(s) IV Push every 6 hours PRN Nausea and/or Vomiting  oxyCODONE    IR 5 milliGRAM(s) Oral every 4 hours PRN Moderate Pain (4 - 6)      Vital Signs Last 24 Hrs  T(C): 36.7 (20 Feb 2022 08:00), Max: 36.9 (20 Feb 2022 00:00)  T(F): 98 (20 Feb 2022 08:00), Max: 98.5 (20 Feb 2022 04:00)  HR: 63 (20 Feb 2022 20:00) (62 - 97)  BP: 102/71 (20 Feb 2022 20:00) (89/52 - 114/72)  BP(mean): 83 (20 Feb 2022 20:00) (67 - 93)  RR: 15 (20 Feb 2022 20:00) (15 - 32)  SpO2: 99% (20 Feb 2022 20:00) (92% - 99%)  I&O's Summary    19 Feb 2022 07:01  -  20 Feb 2022 07:00  --------------------------------------------------------  IN: 2025 mL / OUT: 1850 mL / NET: 175 mL    20 Feb 2022 07:01  -  20 Feb 2022 21:15  --------------------------------------------------------  IN: 0 mL / OUT: 3970 mL / NET: -3970 mL        LABS  LABS:                        8.7    9.61  )-----------( 127      ( 19 Feb 2022 20:00 )             26.2       02-20    138  |  98  |  15  ----------------------------<  91  3.2<L>   |  24  |  0.8    Ca    8.6      20 Feb 2022 16:00  Phos  2.7     02-20  Mg     1.6     02-20          CARDIAC MARKERS ( 19 Feb 2022 11:00 )  x     / <0.01 ng/mL / x     / x     / x      CARDIAC MARKERS ( 18 Feb 2022 23:30 )  x     / 0.01 ng/mL / x     / x     / x          bnp        Culture - Blood (collected 18 Feb 2022 23:30)  Source: .Blood Blood-Venous  Preliminary Report (20 Feb 2022 09:01):    No growth to date.    Culture - Urine (collected 18 Feb 2022 16:00)  Source: .Urine None  Final Report (20 Feb 2022 20:18):    50,000 - 99,000 CFU/mL Escherichia coli  Organism: Escherichia coli (20 Feb 2022 20:18)  Organism: Escherichia coli (20 Feb 2022 20:18)        Assessment & Plan:    36y Female HD#2 POD#0  s/p right cystoscopic right ureteral stent placement secondary to right mild hydronephrosis    NEURO:    Acute pain-controlled with acetaminophen, oxy prn, gabapentin, robaxin    RESP:    Oxygenation- up to 4L NC, O2 sat 94-95%   Activity-ambulate as tolerated   Encourage IS  - AM CXR mildly congested  -f/u cxr    CARDS:     acute tachycardia and hypotension 2/2 Sepsis, resolved    - lactate 1.9 post op     - received 2L IVF post op     Imaging: EKG sinus tachycardia    Labs: Trop 0.09 --> 0.01 x 2, no longer trending   - - s/p lasix, net negative 3900ml    GI/NUTR:     Diet, Regular  - IVL   - Zofran PRN, intermittent nausea    /RENAL:   - acute right hydronephrosis 2/2 calculus-s/p right ureteral stent  - flomax per urology recommendations      Labs:          BUN/Cr- 14/0.9  -->,  17/0.9  -->          Electrolytes-Na 139 // K 4.2 // Mg 2.1 //  Phos 3.5 (02-19 @ 20:00)        HEME/ONC:     DVT prophylaxis-LVX 40mg daily, SCDs    Labs: Hb/Hct:  9.2/27.2  -->,  8.7/26.2  -->                      Plts:  132  -->,  127  -->                 PTT/INR:        ID:  WBC- 2.73  --->>,  11.29  --->>,  9.61  --->>  Temp trend- 24hrs T(F): 98.5 (02-19 @ 20:00), Max: 98.5 (02-19 @ 20:00)  Antibiotics-piperacillin/tazobactam IVPB.. 3.375 every 8 hours    Cultures:      (collected 02-18 @ 16:00)  Source: .Urine None  Preliminary Report:    50,000 - 99,000 CFU/mL Escherichia coli     (collected 02-16 @ 15:07)  Source: Clean Catch Clean Catch (Midstream)  Final Report:    >100,000 CFU/ml Escherichia coli  Organism: Escherichia coli        ENDO:     No active Issues    LINES/DRAINS:  PIV     Advanced Directives: Full Code    Indication: HYPOtension and tachycardia s/p cysto w/ right ureteral stent placement secondary to hydronephrosis in the setting of stones    DISPO:    floor per [facility]        Follow Up:  -2000 labs        Signed out to:  Date:  Time: SICU Transfer Note    GILBERTO HESS  36y (1985)  [id number]      Transfer from: SICU      SICU COURSE:  36y Female HD# 2d, PMHx presenting to ED for severe right-sided flank pain x 2days.     History goes back to yesterday when presented to the ED for same complaint, had CT A/P which revealed a [id number] stone at the UVJ with mild right hydronephrosis and a US pelvic showing b/l ovarian cysts. Patient received fluids and pain meds and was d/c from the ED with Flomax. Today she returns with 7-10/10, R flank pain, nausea, inability to tolerate po and hold down meds. Endorses, dehydration and intense pain of the R flank radiating to the R anterior abdomen.     In the ED, VS T: 99 HR: 86 BP: 88/51 RR: 17 SpO2: 98% in RA. Labs notable for Hb 10.7 (12.5 day before).     Admitted for pain control and hydration.       s/p Cystoscopic insertion of ureteral stent.     2/21  Night  Compazine x 1  Downgrade       2/20  Day  - Miralax started  - Lasix IV 20 x1 --> 2.9L out  - Metolazone 2.5 x1  -transitioned from NC to RA  -1600 BMP pending    2/19  Night  pain controlled   no repletions  d/c IVF  increasing NC requirements    2/19  Day  - Nausea- Zofran, still nauseous- compazine and EKG-     2/18  Night  - procal 14  - lactate 1.9  - switch dilaudid to oxycodone, add gabapentin and robaxin  - start flomax per urology   - NICOM negative, BP clinically improved, additional 750cc given  - switch to zosyn   - increase IVF to 125  - trop 0.09 --> neg  - ID: continue zosyn    2/18  Day   - admit to SICU  - trop 0.09, pending CE x2  - Mg, Phos repleted        MEDICATIONS  (STANDING):  acetaminophen     Tablet .. 650 milliGRAM(s) Oral every 6 hours  enoxaparin Injectable 40 milliGRAM(s) SubCutaneous daily  gabapentin 100 milliGRAM(s) Oral three times a day  methocarbamol 750 milliGRAM(s) Oral four times a day  piperacillin/tazobactam IVPB.. 3.375 Gram(s) IV Intermittent every 8 hours  prochlorperazine   IVPB 10 milliGRAM(s) IV Intermittent once  tamsulosin 0.4 milliGRAM(s) Oral at bedtime    MEDICATIONS  (PRN):  ondansetron Injectable 4 milliGRAM(s) IV Push every 6 hours PRN Nausea and/or Vomiting  oxyCODONE    IR 5 milliGRAM(s) Oral every 4 hours PRN Moderate Pain (4 - 6)      Vital Signs Last 24 Hrs  T(C): 36.7 (20 Feb 2022 08:00), Max: 36.9 (20 Feb 2022 00:00)  T(F): 98 (20 Feb 2022 08:00), Max: 98.5 (20 Feb 2022 04:00)  HR: 63 (20 Feb 2022 20:00) (62 - 97)  BP: 102/71 (20 Feb 2022 20:00) (89/52 - 114/72)  BP(mean): 83 (20 Feb 2022 20:00) (67 - 93)  RR: 15 (20 Feb 2022 20:00) (15 - 32)  SpO2: 99% (20 Feb 2022 20:00) (92% - 99%)  I&O's Summary    19 Feb 2022 07:01  -  20 Feb 2022 07:00  --------------------------------------------------------  IN: 2025 mL / OUT: 1850 mL / NET: 175 mL    20 Feb 2022 07:01  -  20 Feb 2022 21:15  --------------------------------------------------------  IN: 0 mL / OUT: 3970 mL / NET: -3970 mL        LABS  LABS:                        8.7    9.61  )-----------( 127      ( 19 Feb 2022 20:00 )             26.2       02-20    138  |  98  |  15  ----------------------------<  91  3.2<L>   |  24  |  0.8    Ca    8.6      20 Feb 2022 16:00  Phos  2.7     02-20  Mg     1.6     02-20          CARDIAC MARKERS ( 19 Feb 2022 11:00 )  x     / <0.01 ng/mL / x     / x     / x      CARDIAC MARKERS ( 18 Feb 2022 23:30 )  x     / 0.01 ng/mL / x     / x     / x          bnp    Culture - Blood (collected 18 Feb 2022 23:30)  Source: .Blood Blood-Venous  Preliminary Report (20 Feb 2022 09:01):    No growth to date.    Culture - Urine (collected 18 Feb 2022 16:00)  Source: .Urine None  Final Report (20 Feb 2022 20:18):    50,000 - 99,000 CFU/mL Escherichia coli  Organism: Escherichia coli (20 Feb 2022 20:18)  Organism: Escherichia coli (20 Feb 2022 20:18)        Assessment & Plan:    36y Female HD#2 POD#0  s/p right cystoscopic right ureteral stent placement secondary to right mild hydronephrosis    NEURO:    Acute pain-controlled with acetaminophen, oxy prn, gabapentin, robaxin    RESP:    Oxygenation- up to 4L NC, O2 sat 94-95%   Activity-ambulate as tolerated   Encourage IS  - AM CXR mildly congested  -f/u cxr    CARDS:     acute tachycardia and hypotension 2/2 Sepsis, resolved    - lactate 1.9 post op     - received 2L IVF post op     Imaging: EKG sinus tachycardia    Labs: Trop 0.09 --> 0.01 x 2, no longer trending   - s/p lasix, net negative 3900ml    GI/NUTR:     Diet, Regular  - IVL   - Zofran PRN, intermittent nausea  - compazine prn for nausea not controlled w zofran.    /RENAL:   - acute right hydronephrosis 2/2 calculus-s/p right ureteral stent  - flomax per urology recommendations      Labs:          BUN/Cr- 14/0.9  -->,  17/0.9  -->          Electrolytes-Na 139 // K 4.2 // Mg 2.1 //  Phos 3.5 (02-19 @ 20:00)        HEME/ONC:     DVT prophylaxis-LVX 40mg daily, SCDs    Labs: Hb/Hct:  9.2/27.2  -->,  8.7/26.2  -->                      Plts:  132  -->,  127  -->            ID:  WBC- 2.73  --->>,  11.29  --->>,  9.61  --->>  Temp trend- 24hrs T(F): 98.5 (02-19 @ 20:00), Max: 98.5 (02-19 @ 20:00)  Antibiotics-piperacillin/tazobactam IVPB.. 3.375 every 8 hours    Cultures:      (collected 02-18 @ 16:00)  Source: .Urine None  Preliminary Report:    50,000 - 99,000 CFU/mL Escherichia coli     (collected 02-16 @ 15:07)  Source: Clean Catch Clean Catch (Midstream)  Final Report:    >100,000 CFU/ml Escherichia coli  Organism: Escherichia coli      ENDO:     No active Issues    LINES/DRAINS:  PIV     Advanced Directives: Full Code    Indication: HYPOtension and tachycardia s/p cysto w/ right ureteral stent placement secondary to hydronephrosis in the setting of stones    DISPO:    floor per [facility]        Follow Up:  -2000 labs        Signed out to: Jake  Date:  02/20/2022  Time: 10:18pm

## 2022-02-20 NOTE — PROGRESS NOTE ADULT - SUBJECTIVE AND OBJECTIVE BOX
GILBERTO HESS  36y, Female  Allergy: No Known Allergies      LOS  2d    CHIEF COMPLAINT: Pain control (20 Feb 2022 08:59)      INTERVAL EVENTS/HPI  - No acute events overnight  - T(F): , Max: 98.5 (02-19-22 @ 20:00)  - poor apetite, feels nauseated  - WBC Count: 9.61 (02-19-22 @ 20:00)  WBC Count: 11.29 (02-19-22 @ 11:00)     - Creatinine, Serum: 0.9 (02-19-22 @ 20:00)  Creatinine, Serum: 0.9 (02-19-22 @ 11:00)       ROS  General: Denies rigors, nightsweats  HEENT: Denies headache, rhinorrhea, sore throat, eye pain  CV: Denies CP, palpitations  PULM: Denies wheezing, hemoptysis  GI: Denies hematemesis, hematochezia, melena  : Denies discharge, hematuria  MSK: Denies arthralgias, myalgias  SKIN: Denies rash, lesions  NEURO: Denies paresthesias, weakness  PSYCH: Denies depression, anxiety    VITALS:  T(F): 98.5, Max: 98.5 (02-19-22 @ 20:00)  HR: 66  BP: 91/54  RR: 19Vital Signs Last 24 Hrs  T(C): 36.9 (20 Feb 2022 04:00), Max: 36.9 (19 Feb 2022 20:00)  T(F): 98.5 (20 Feb 2022 04:00), Max: 98.5 (19 Feb 2022 20:00)  HR: 66 (20 Feb 2022 13:00) (62 - 94)  BP: 91/54 (20 Feb 2022 13:00) (89/52 - 110/73)  BP(mean): 67 (20 Feb 2022 13:00) (67 - 87)  RR: 19 (20 Feb 2022 13:00) (13 - 32)  SpO2: 99% (20 Feb 2022 13:00) (92% - 99%)    PHYSICAL EXAM:  Gen: NAD, resting in bed  HEENT: Normocephalic, atraumatic  Neck: supple, no lymphadenopathy  CV: Regular rate & regular rhythm  Lungs: decreased BS at bases, no fremitus  Abdomen: Soft, BS present  Ext: Warm, well perfused  Neuro: non focal, awake  Skin: no rash, no erythema  Lines: no phlebitis    FH: Non-contributory  Social Hx: Non-contributory    TESTS & MEASUREMENTS:                        8.7    9.61  )-----------( 127      ( 19 Feb 2022 20:00 )             26.2     02-19    139  |  107  |  17  ----------------------------<  102<H>  4.2   |  22  |  0.9    Ca    7.6<L>      19 Feb 2022 20:00  Phos  3.5     02-19  Mg     2.1     02-19      eGFR if Non African American: 82 mL/min/1.73M2 (02-19-22 @ 20:00)  eGFR if African American: 95 mL/min/1.73M2 (02-19-22 @ 20:00)          Culture - Blood (collected 02-18-22 @ 23:30)  Source: .Blood Blood-Venous  Preliminary Report (02-20-22 @ 09:01):    No growth to date.    Culture - Urine (collected 02-18-22 @ 16:00)  Source: .Urine None  Preliminary Report (02-19-22 @ 21:12):    50,000 - 99,000 CFU/mL Escherichia coli    Culture - Urine (collected 02-16-22 @ 15:07)  Source: Clean Catch Clean Catch (Midstream)  Final Report (02-19-22 @ 09:58):    >100,000 CFU/ml Escherichia coli  Organism: Escherichia coli (02-19-22 @ 09:58)  Organism: Escherichia coli (02-19-22 @ 09:58)      -  Amikacin: S <=16      -  Amoxicillin/Clavulanic Acid: S <=8/4      -  Ampicillin: S <=8 These ampicillin results predict results for amoxicillin      -  Ampicillin/Sulbactam: S <=4/2 Enterobacter, Klebsiella aerogenes, Citrobacter, and Serratia may develop resistance during prolonged therapy (3-4 days)      -  Aztreonam: S <=4      -  Cefazolin: S <=2 (MIC_CL_COM_ENTERIC_CEFAZU) For uncomplicated UTI with K. pneumoniae, E. coli, or P. mirablis: ANGIE <=16 is sensitive and ANGIE >=32 is resistant. This also predicts results for oral agents cefaclor, cefdinir, cefpodoxime, cefprozil, cefuroxime axetil, cephalexin and locarbef for uncomplicated UTI. Note that some isolates may be susceptible to these agents while testing resistant to cefazolin.      -  Cefepime: S <=2      -  Cefoxitin: S <=8      -  Ceftriaxone: S <=1 Enterobacter, Klebsiella aerogenes, Citrobacter, and Serratia may develop resistance during prolonged therapy      -  Ciprofloxacin: S <=0.25      -  Ertapenem: S <=0.5      -  Gentamicin: S <=2      -  Imipenem: S <=1      -  Levofloxacin: S <=0.5      -  Meropenem: S <=1      -  Nitrofurantoin: S <=32 Should not be used to treat pyelonephritis      -  Piperacillin/Tazobactam: S <=8      -  Tigecycline: S <=2      -  Tobramycin: S <=2      -  Trimethoprim/Sulfamethoxazole: S <=0.5/9.5      Method Type: ANGIE        Lactate, Blood: 1.9 mmol/L (02-18-22 @ 20:00)  Lactate, Blood: 2.0 mmol/L (02-18-22 @ 16:33)  Lactate, Blood: 1.3 mmol/L (02-17-22 @ 20:28)  Lactate, Blood: 1.3 mmol/L (02-16-22 @ 15:53)  Lactate, Blood: 4.5 mmol/L (02-16-22 @ 12:53)      INFECTIOUS DISEASES TESTING  Procalcitonin, Serum: 14.40 (02-18-22 @ 04:30)  COVID-19 PCR: NotDetec (02-17-22 @ 20:28)      INFLAMMATORY MARKERS      RADIOLOGY & ADDITIONAL TESTS:  I have personally reviewed the last available Chest xray  CXR      CT      CARDIOLOGY TESTING      MEDICATIONS  acetaminophen     Tablet .. 650 Oral every 6 hours  enoxaparin Injectable 40 SubCutaneous daily  gabapentin 100 Oral three times a day  methocarbamol 750 Oral four times a day  piperacillin/tazobactam IVPB.. 3.375 IV Intermittent every 8 hours  tamsulosin 0.4 Oral at bedtime      WEIGHT  Weight (kg): 59.9 (02-18-22 @ 15:22)  Creatinine, Serum: 0.9 mg/dL (02-19-22 @ 20:00)      ANTIBIOTICS:  piperacillin/tazobactam IVPB.. 3.375 Gram(s) IV Intermittent every 8 hours      All available historical records have been reviewed

## 2022-02-20 NOTE — PROGRESS NOTE ADULT - SUBJECTIVE AND OBJECTIVE BOX
GILBERTO HESS  [id number]  36y Female    Indication for ICU admission: HYPOtension and tachycardia s/p cysto w/ right ureteral stent placement secondary to hydronephrosis in the setting on stones    Admit Date:02-18-22  ICU Date: 2/18  OR Date: 2/18    No Known Allergies    PAST MEDICAL & SURGICAL HISTORY:  None    Home Medications:  None      24HRS EVENT:  2/19  Night  pain controlled   no repletions    2/19  Day  - Nausea- Zofran, still nauseous- compazine and EKG-       DVT PTX: Lovenox    GI PTX:     ***Tubes/Lines/Drains  ***  [x] Peripheral IV      REVIEW OF SYSTEMS  [x ] A ten-point review of systems was otherwise negative except as noted.  [ ] Due to altered mental status/intubation, subjective information were not able to be obtained from the patient. History was obtained, to the extent possible, from review of the chart and collateral sources of information.  SICU   GILBERTO HESS  [id number]  36y Female    Indication for ICU admission: HYPOtension and tachycardia s/p cysto w/ right ureteral stent placement secondary to hydronephrosis in the setting on stones    Admit Date:22  ICU Date:   OR Date:     No Known Allergies    PAST MEDICAL & SURGICAL HISTORY:  None    Home Medications:  None      24HRS EVENT:    Night  pain controlled   no repletions      Day  - Nausea- Zofran, still nauseous- compazine and EKG-       DVT PTX: Lovenox    GI PTX:     ***Tubes/Lines/Drains  ***  [x] Peripheral IV      REVIEW OF SYSTEMS  [x ] A ten-point review of systems was otherwise negative except as noted.  [ ] Due to altered mental status/intubation, subjective information were not able to be obtained from the patient. History was obtained, to the extent possible, from review of the chart and collateral sources of information.        CURRENT MEDS:  Neurologic Medications  acetaminophen     Tablet .. 650 milliGRAM(s) Oral every 6 hours  gabapentin 100 milliGRAM(s) Oral three times a day  methocarbamol 750 milliGRAM(s) Oral four times a day  ondansetron Injectable 4 milliGRAM(s) IV Push every 6 hours PRN Nausea and/or Vomiting  oxyCODONE    IR 5 milliGRAM(s) Oral every 4 hours PRN Moderate Pain (4 - 6)    Respiratory Medications    Cardiovascular Medications  tamsulosin 0.4 milliGRAM(s) Oral at bedtime    Gastrointestinal Medications    Genitourinary Medications    Hematologic/Oncologic Medications  enoxaparin Injectable 40 milliGRAM(s) SubCutaneous daily    Antimicrobial/Immunologic Medications  piperacillin/tazobactam IVPB.. 3.375 Gram(s) IV Intermittent every 8 hours    Endocrine/Metabolic Medications    Topical/Other Medications      ICU Vital Signs Last 24 Hrs  T(C): 36.9 (2022 04:00), Max: 36.9 (2022 20:00)  T(F): 98.5 (2022 04:00), Max: 98.5 (2022 20:00)  HR: 69 (2022 07:00) (62 - 94)  BP: 96/57 (2022 07:00) (89/52 - 108/69)  BP(mean): 70 (2022 07:00) (68 - 83)  ABP: --  ABP(mean): --  RR: 17 (:) (12 - 32)  SpO2: 94% (:) (92% - 99%)        I&O's Detail    2022 07:01  -  2022 07:00  --------------------------------------------------------  IN:    IV PiggyBack: 100 mL    IV PiggyBack: 250 mL    IV PiggyBack: 300 mL    Lactated Ringers: 1375 mL  Total IN: 5 mL    OUT:    Voided (mL): 1850 mL  Total OUT: 1850 mL    Total NET: 175 mL        I&O's Summary    2022 07:01  -  2022 07:00  --------------------------------------------------------  IN: 5 mL / OUT: 1850 mL / NET: 175 mL        LABS:                        8.7    9.61  )-----------( 127      ( 2022 20:00 )             26.2       Auto Neutrophil %: 87.9 % (22 @ 20:00)  Auto Immature Granulocyte %: 0.3 % (22 @ 20:00)  Auto Neutrophil %: 91.7 % (22 @ 11:00)  Auto Immature Granulocyte %: 1.5 % (22 @ 11:00)        139  |  107  |  17  ----------------------------<  102<H>  4.2   |  22  |  0.9      Calcium, Total Serum: 7.6 mg/dL (22 @ 20:00)       Lactate, Blood: 1.9 mmol/L (02-18-22 @ 20:00)  Lactate, Blood: 2.0 mmol/L (22 @ 16:33)  Lactate, Blood: 1.3 mmol/L (22 @ 20:28)      Coags:    CARDIAC MARKERS ( 2022 11:00 )  x     / <0.01 ng/mL / x     / x     / x      CARDIAC MARKERS ( 2022 23:30 )  x     / 0.01 ng/mL / x     / x     / x      CARDIAC MARKERS ( 2022 16:33 )  x     / 0.09 ng/mL / 397 U/L / x     / 4.0 ng/mL      Urinalysis Basic - ( 2022 10:26 )    Color: Emilia / Appearance: Slightly Turbid / S.023 / pH: x  Gluc: x / Ketone: Small  / Bili: Small / Urobili: 3 mg/dL   Blood: x / Protein: 30 mg/dL / Nitrite: Negative   Leuk Esterase: Large / RBC: x / WBC x   Sq Epi: x / Non Sq Epi: x / Bacteria: x    Culture - Urine (collected 2022 16:00)  Source: .Urine None  Preliminary Report (2022 21:12):    50,000 - 99,000 CFU/mL Escherichia coli          PHYSICAL EXAM:    General/Neuro  GCS:  15     Deficits:  alert & oriented x 3, no focal deficits  Lungs:  clear to auscultation, Normal expansion/effort.   Cardiovascular : S1, S2.  Regular rate and rhythm.    Cardiac Rhythm: Normal Sinus Rhythm  GI: Abdomen soft, mild right sided flank tenderness, Non-distended.    Extremities: Extremities warm, well-perfused.  No Peripheral edema b/l LE.    Derm: Good skin turgor, no skin breakdown.    : No Burton catheter.

## 2022-02-20 NOTE — PROGRESS NOTE ADULT - SUBJECTIVE AND OBJECTIVE BOX
UROLOGY PROGRESS NOTE:     Pt is a 36y F POD#2 s/p cystoscopy, R ureteral stent placement. Pt seen and examined at bedside today am. Pt required 4L NC; CXR showed pt fluid overloaded, ordered for Lasix by SICU team . She reports improvement in RLQ/flank pain. Denies fevers/chills, N/V.     MEDICATIONS  (STANDING):  acetaminophen     Tablet .. 650 milliGRAM(s) Oral every 6 hours  enoxaparin Injectable 40 milliGRAM(s) SubCutaneous daily  furosemide   Injectable 20 milliGRAM(s) IV Push once  gabapentin 100 milliGRAM(s) Oral three times a day  methocarbamol 750 milliGRAM(s) Oral four times a day  metolazone 2.5 milliGRAM(s) Oral once  piperacillin/tazobactam IVPB.. 3.375 Gram(s) IV Intermittent every 8 hours  polyethylene glycol 3350 17 Gram(s) Oral once  tamsulosin 0.4 milliGRAM(s) Oral at bedtime    MEDICATIONS  (PRN):  ondansetron Injectable 4 milliGRAM(s) IV Push every 6 hours PRN Nausea and/or Vomiting  oxyCODONE    IR 5 milliGRAM(s) Oral every 4 hours PRN Moderate Pain (4 - 6)    Review of Systems:  [X ] A ten point review of systems was otherwise negative except as noted.    Vital Signs Last 24 Hrs  T(C): 36.9 (2022 04:00), Max: 36.9 (2022 20:00)  T(F): 98.5 (2022 04:00), Max: 98.5 (2022 20:00)  HR: 89 (2022 08:00) (62 - 94)  BP: 103/68 (2022 08:00) (89/52 - 108/69)  BP(mean): 81 (2022 08:00) (68 - 83)  RR: 20 (2022 08:00) (12 - 32)  SpO2: 95% (2022 08:00) (92% - 99%)    PHYSICAL EXAM:  GEN: NAD  NEURO: Awake and alert   SKIN: Good color, non diaphoretic  HEENT: NC/AT  RESP: +NC in place on 4L O2  CARDIO: +S1/S2  ABDO: Soft, ND/NT  BACK: No CVAT B/L  : Pt voiding by self      I&O's Summary    2022 07:01  -  2022 07:00  --------------------------------------------------------  IN:  mL / OUT: 1850 mL / NET: 175 mL      LABS:                        8.7    9.61  )-----------( 127      ( 2022 20:00 )             26.2     02-19    139  |  107  |  17  ----------------------------<  102<H>  4.2   |  22  |  0.9    Ca    7.6<L>      2022 20:00  Phos  3.5       Mg     2.1         Urinalysis Basic - ( 2022 10:26 )  Color: Emilia / Appearance: Slightly Turbid / S.023 / pH: x  Gluc: x / Ketone: Small  / Bili: Small / Urobili: 3 mg/dL   Blood: x / Protein: 30 mg/dL / Nitrite: Negative   Leuk Esterase: Large / RBC: x / WBC x   Sq Epi: x / Non Sq Epi: x / Bacteria: x

## 2022-02-20 NOTE — PROGRESS NOTE ADULT - ASSESSMENT
ASSESSMENT  35 yo F no PMHx presenting to ED for severe right-sided flank pain x 2days with right UVJ stone and mild hydro     IMPRESSION  #Pyelonephritis with right UVJ stone/mild hydro  -  CT Abdomen and Pelvis w/ IV Cont (02.16.22 @ 15:19): Mild right hydroureteronephrosis secondary to a 3 x 2 x 3 mm calculus in  the region of the ureterovesicular junction.  - s/p cystoscopy with right ureteral sten 2/18 - purulent fluid noted by right kidney   - Urine Cx 2/16, 2/18 E coli     #Abx allergy: NKDA    RECOMMENDATIONS  - narrow zosyn to ceftriaxone 1g daily   - when discharged, can switch to PO vantin 200 mg BID -- will plan 10-14 days pending clinical course     Please call or message on Microsoft Teams if with any questions.  Spectra [phone]

## 2022-02-20 NOTE — PROGRESS NOTE ADULT - ASSESSMENT
Assessment & Plan  36y Female HD#2 POD#0  s/p right cystoscopic right ureteral stent placement secondary to right mild hydronephrosis    NEURO:    Acute pain-controlled with acetaminophen, oxy prn, gabapentin, robaxin    RESP:    Oxygenation- saturating well on RA    Activity-ambulate as tolerated   Encourage IS  f/u AM CXR        CARDS:     acute tachycardia and hypotension 2/2 Sepsis     - lactate 1.9 post op     - received 2L IVF post op     Imaging: EKG sinus tachycardia    Labs: Trop 0.09 --> 0.01 x2, no longer trending     GI/NUTR:     Diet, Regular  LR @ 50,  IVL when tolerating diet      /RENAL:     acute right hydronephrosis 2/2 calculus-s/p right ureteral stent  - flomax per urology recommendations    Monitor UO- voiding    Labs:          BUN/Cr- 14/0.9  -->,  17/0.9  -->          Electrolytes-Na 139 // K 4.2 // Mg 2.1 //  Phos 3.5 (02-19 @ 20:00)         HEME/ONC:       DVT prophylaxis-LVX 40mg daily, SCDs    Labs: Hb/Hct:  9.2/27.2  -->,  8.7/26.2  -->                      Plts:  132  -->,  127  -->                 PTT/INR:        ID:  WBC- 2.73  --->>,  11.29  --->>,  9.61  --->>  Temp trend- 24hrs T(F): 98.5 (02-19 @ 20:00), Max: 98.5 (02-19 @ 20:00)  Antibiotics-piperacillin/tazobactam IVPB.. 3.375 every 8 hours    Cultures:      (collected 02-18 @ 16:00)  Source: .Urine None  Preliminary Report:    50,000 - 99,000 CFU/mL Escherichia coli     (collected 02-16 @ 15:07)  Source: Clean Catch Clean Catch (Midstream)  Final Report:    >100,000 CFU/ml Escherichia coli  Organism: Escherichia coli  Organism: Escherichia coli      ENDO:     No active Issues    LINES/DRAINS:  PIV,     Advanced Directives: Full Code    Indication: HYPOtension and tachycardia s/p cysto w/ right ureteral stent placement secondary to hydronephrosis in the setting on stones    DISPO:     Assessment & Plan  36y Female HD#2 POD#0  s/p right cystoscopic right ureteral stent placement secondary to right mild hydronephrosis    NEURO:    Acute pain-controlled with acetaminophen, oxy prn, gabapentin, robaxin    RESP:    Oxygenation- 4L NC   Activity-ambulate as tolerated   Encourage IS  f/u AM CXR        CARDS:     acute tachycardia and hypotension 2/2 Sepsis     - lactate 1.9 post op     - received 2L IVF post op     Imaging: EKG sinus tachycardia    Labs: Trop 0.09 --> 0.01 x2, no longer trending     GI/NUTR:     Diet, Regular, IVL     /RENAL:     acute right hydronephrosis 2/2 calculus-s/p right ureteral stent  - flomax per urology recommendations    Monitor UO- voiding    Labs:          BUN/Cr- 14/0.9  -->,  17/0.9  -->          Electrolytes-Na 139 // K 4.2 // Mg 2.1 //  Phos 3.5 (02-19 @ 20:00)         HEME/ONC:       DVT prophylaxis-LVX 40mg daily, SCDs    Labs: Hb/Hct:  9.2/27.2  -->,  8.7/26.2  -->                      Plts:  132  -->,  127  -->                 PTT/INR:        ID:  WBC- 2.73  --->>,  11.29  --->>,  9.61  --->>  Temp trend- 24hrs T(F): 98.5 (02-19 @ 20:00), Max: 98.5 (02-19 @ 20:00)  Antibiotics-piperacillin/tazobactam IVPB.. 3.375 every 8 hours    Cultures:      (collected 02-18 @ 16:00)  Source: .Urine None  Preliminary Report:    50,000 - 99,000 CFU/mL Escherichia coli     (collected 02-16 @ 15:07)  Source: Clean Catch Clean Catch (Midstream)  Final Report:    >100,000 CFU/ml Escherichia coli  Organism: Escherichia coli  Organism: Escherichia coli      ENDO:     No active Issues    LINES/DRAINS:  PIV,     Advanced Directives: Full Code    Indication: HYPOtension and tachycardia s/p cysto w/ right ureteral stent placement secondary to hydronephrosis in the setting on stones    DISPO:    SICU   Assessment & Plan  36y Female HD#2 POD#0  s/p right cystoscopic right ureteral stent placement secondary to right mild hydronephrosis    NEURO:    Acute pain-controlled with acetaminophen, oxy prn, gabapentin, robaxin    RESP:    Oxygenation- up to 4L NC, O2 sat 94-95%   Activity-ambulate as tolerated   Encourage IS  - AM CXR mildly congested    CARDS:     acute tachycardia and hypotension 2/2 Sepsis, resolved    - lactate 1.9 post op     - received 2L IVF post op     Imaging: EKG sinus tachycardia    Labs: Trop 0.09 --> 0.01 x 2, no longer trending     GI/NUTR:     Diet, Regular  - IVL     /RENAL:     acute right hydronephrosis 2/2 calculus-s/p right ureteral stent  - flomax per urology recommendations    Monitor UO- voiding    Labs:          BUN/Cr- 14/0.9  -->,  17/0.9  -->          Electrolytes-Na 139 // K 4.2 // Mg 2.1 //  Phos 3.5 (02-19 @ 20:00)      HEME/ONC:       DVT prophylaxis-LVX 40mg daily, SCDs    Labs: Hb/Hct:  9.2/27.2  -->,  8.7/26.2  -->                      Plts:  132  -->,  127  -->                 PTT/INR:        ID:  WBC- 2.73  --->>,  11.29  --->>,  9.61  --->>  Temp trend- 24hrs T(F): 98.5 (02-19 @ 20:00), Max: 98.5 (02-19 @ 20:00)  Antibiotics-piperacillin/tazobactam IVPB.. 3.375 every 8 hours    Cultures:      (collected 02-18 @ 16:00)  Source: .Urine None  Preliminary Report:    50,000 - 99,000 CFU/mL Escherichia coli     (collected 02-16 @ 15:07)  Source: Clean Catch Clean Catch (Midstream)  Final Report:    >100,000 CFU/ml Escherichia coli  Organism: Escherichia coli  Organism: Escherichia coli      ENDO:     No active Issues    LINES/DRAINS:  PIV     Advanced Directives: Full Code    Indication: HYPOtension and tachycardia s/p cysto w/ right ureteral stent placement secondary to hydronephrosis in the setting of stones    DISPO:    SICU

## 2022-02-20 NOTE — PROGRESS NOTE ADULT - ASSESSMENT
Pt is a 36y F POD#2 s/p cystoscopy, R ureteral stent placement.     ·	CXR showed pt fluid overloaded, ordered for Lasix by SICU team  ·	cont IV abx (Zosyn) as per ID  ·	BCx prelim- no growth; Intraop UCx prelim- Ecoli   ·	Cont to monitor UO  ·	Cont with incentive spirometer- pt instructed how to use   ·	Pt aware she will need outpatient follow up with urology for definitive stone management, stent removal/exchange   ·	w/d with attng

## 2022-02-21 ENCOUNTER — TRANSCRIPTION ENCOUNTER (OUTPATIENT)
Age: 37
End: 2022-02-21

## 2022-02-21 VITALS
SYSTOLIC BLOOD PRESSURE: 109 MMHG | OXYGEN SATURATION: 100 % | RESPIRATION RATE: 18 BRPM | HEART RATE: 62 BPM | DIASTOLIC BLOOD PRESSURE: 65 MMHG | TEMPERATURE: 98 F

## 2022-02-21 LAB
-  AMIKACIN: SIGNIFICANT CHANGE UP
-  AMOXICILLIN/CLAVULANIC ACID: SIGNIFICANT CHANGE UP
-  AMPICILLIN/SULBACTAM: SIGNIFICANT CHANGE UP
-  AMPICILLIN: SIGNIFICANT CHANGE UP
-  AZTREONAM: SIGNIFICANT CHANGE UP
-  CEFAZOLIN: SIGNIFICANT CHANGE UP
-  CEFEPIME: SIGNIFICANT CHANGE UP
-  CEFOXITIN: SIGNIFICANT CHANGE UP
-  CEFTRIAXONE: SIGNIFICANT CHANGE UP
-  CIPROFLOXACIN: SIGNIFICANT CHANGE UP
-  ERTAPENEM: SIGNIFICANT CHANGE UP
-  GENTAMICIN: SIGNIFICANT CHANGE UP
-  IMIPENEM: SIGNIFICANT CHANGE UP
-  LEVOFLOXACIN: SIGNIFICANT CHANGE UP
-  MEROPENEM: SIGNIFICANT CHANGE UP
-  NITROFURANTOIN: SIGNIFICANT CHANGE UP
-  PIPERACILLIN/TAZOBACTAM: SIGNIFICANT CHANGE UP
-  TIGECYCLINE: SIGNIFICANT CHANGE UP
-  TOBRAMYCIN: SIGNIFICANT CHANGE UP
-  TRIMETHOPRIM/SULFAMETHOXAZOLE: SIGNIFICANT CHANGE UP
ANION GAP SERPL CALC-SCNC: 14 MMOL/L — SIGNIFICANT CHANGE UP (ref 7–14)
BUN SERPL-MCNC: 11 MG/DL — SIGNIFICANT CHANGE UP (ref 10–20)
CALCIUM SERPL-MCNC: 8.9 MG/DL — SIGNIFICANT CHANGE UP (ref 8.5–10.1)
CHLORIDE SERPL-SCNC: 93 MMOL/L — LOW (ref 98–110)
CO2 SERPL-SCNC: 27 MMOL/L — SIGNIFICANT CHANGE UP (ref 17–32)
CREAT SERPL-MCNC: 0.7 MG/DL — SIGNIFICANT CHANGE UP (ref 0.7–1.5)
CULTURE RESULTS: SIGNIFICANT CHANGE UP
GLUCOSE SERPL-MCNC: 92 MG/DL — SIGNIFICANT CHANGE UP (ref 70–99)
HCT VFR BLD CALC: 31 % — LOW (ref 37–47)
HGB BLD-MCNC: 10.3 G/DL — LOW (ref 12–16)
MCHC RBC-ENTMCNC: 27.8 PG — SIGNIFICANT CHANGE UP (ref 27–31)
MCHC RBC-ENTMCNC: 33.2 G/DL — SIGNIFICANT CHANGE UP (ref 32–37)
MCV RBC AUTO: 83.8 FL — SIGNIFICANT CHANGE UP (ref 81–99)
METHOD TYPE: SIGNIFICANT CHANGE UP
NRBC # BLD: 0 /100 WBCS — SIGNIFICANT CHANGE UP (ref 0–0)
ORGANISM # SPEC MICROSCOPIC CNT: SIGNIFICANT CHANGE UP
ORGANISM # SPEC MICROSCOPIC CNT: SIGNIFICANT CHANGE UP
PLATELET # BLD AUTO: 172 K/UL — SIGNIFICANT CHANGE UP (ref 130–400)
POTASSIUM SERPL-MCNC: 3.3 MMOL/L — LOW (ref 3.5–5)
POTASSIUM SERPL-SCNC: 3.3 MMOL/L — LOW (ref 3.5–5)
RBC # BLD: 3.7 M/UL — LOW (ref 4.2–5.4)
RBC # FLD: 13.7 % — SIGNIFICANT CHANGE UP (ref 11.5–14.5)
SODIUM SERPL-SCNC: 134 MMOL/L — LOW (ref 135–146)
SPECIMEN SOURCE: SIGNIFICANT CHANGE UP
WBC # BLD: 6.78 K/UL — SIGNIFICANT CHANGE UP (ref 4.8–10.8)
WBC # FLD AUTO: 6.78 K/UL — SIGNIFICANT CHANGE UP (ref 4.8–10.8)

## 2022-02-21 PROCEDURE — 99232 SBSQ HOSP IP/OBS MODERATE 35: CPT

## 2022-02-21 PROCEDURE — 71045 X-RAY EXAM CHEST 1 VIEW: CPT | Mod: 26

## 2022-02-21 RX ORDER — CEFPODOXIME PROXETIL 100 MG
1 TABLET ORAL
Qty: 28 | Refills: 0
Start: 2022-02-21 | End: 2022-03-06

## 2022-02-21 RX ORDER — ONDANSETRON 8 MG/1
1 TABLET, FILM COATED ORAL
Qty: 21 | Refills: 0
Start: 2022-02-21 | End: 2022-02-27

## 2022-02-21 RX ORDER — ACETAMINOPHEN 500 MG
2 TABLET ORAL
Qty: 0 | Refills: 0 | DISCHARGE
Start: 2022-02-21

## 2022-02-21 RX ORDER — TAMSULOSIN HYDROCHLORIDE 0.4 MG/1
1 CAPSULE ORAL
Qty: 30 | Refills: 0
Start: 2022-02-21 | End: 2022-03-22

## 2022-02-21 RX ORDER — POTASSIUM CHLORIDE 20 MEQ
40 PACKET (EA) ORAL ONCE
Refills: 0 | Status: COMPLETED | OUTPATIENT
Start: 2022-02-21 | End: 2022-02-21

## 2022-02-21 RX ORDER — OXYCODONE HYDROCHLORIDE 5 MG/1
1 TABLET ORAL
Qty: 20 | Refills: 0
Start: 2022-02-21 | End: 2022-02-25

## 2022-02-21 RX ADMIN — ENOXAPARIN SODIUM 40 MILLIGRAM(S): 100 INJECTION SUBCUTANEOUS at 11:10

## 2022-02-21 RX ADMIN — GABAPENTIN 100 MILLIGRAM(S): 400 CAPSULE ORAL at 05:12

## 2022-02-21 RX ADMIN — Medication 650 MILLIGRAM(S): at 05:13

## 2022-02-21 RX ADMIN — Medication 650 MILLIGRAM(S): at 11:08

## 2022-02-21 RX ADMIN — METHOCARBAMOL 750 MILLIGRAM(S): 500 TABLET, FILM COATED ORAL at 05:12

## 2022-02-21 RX ADMIN — ONDANSETRON 4 MILLIGRAM(S): 8 TABLET, FILM COATED ORAL at 11:11

## 2022-02-21 RX ADMIN — GABAPENTIN 100 MILLIGRAM(S): 400 CAPSULE ORAL at 13:40

## 2022-02-21 RX ADMIN — METHOCARBAMOL 750 MILLIGRAM(S): 500 TABLET, FILM COATED ORAL at 11:09

## 2022-02-21 RX ADMIN — PIPERACILLIN AND TAZOBACTAM 25 GRAM(S): 4; .5 INJECTION, POWDER, LYOPHILIZED, FOR SOLUTION INTRAVENOUS at 13:42

## 2022-02-21 RX ADMIN — PIPERACILLIN AND TAZOBACTAM 25 GRAM(S): 4; .5 INJECTION, POWDER, LYOPHILIZED, FOR SOLUTION INTRAVENOUS at 05:12

## 2022-02-21 RX ADMIN — Medication 40 MILLIEQUIVALENT(S): at 14:25

## 2022-02-21 NOTE — PROGRESS NOTE ADULT - ATTENDING COMMENTS
Agree with note above. Seen with ACP and examined. Improving.
Pt seen and is slowly improving from sepsis. Continue IV abx. All questions answered for family. Agree with note above.
36y Female HD#2 POD#1 s/p right cystoscopic right ureteral stent placement secondary to right mild hydronephrosis     Acute pain-controlled with acetaminophen, oxy prn, gabapentin, robaxin     Oxygenation- saturating well on RA    Activity-ambulate as tolerated   Encourage IS  f/u AM CXR         hemodynamic instability     acute tachycardia and hypotension 2/2 urosepsis     - lactate 1.9 post op     - received 2L IVF post op   - NICOM negative overnight    Imaging: EKG sinus tachycardia    Labs: Trop 0.09 --->0.01, f/u 3rd set  - keep map >65       Diet, Regular  IVL when tolerating diet        acute right hydronephrosis 2/2 calculus-s/p right ureteral stent  - flomax per urology recommendations    Monitor UO- voiding  Labs pending           DVT prophylaxis- LVX 40mg daily, SCDs  LR @ 125     pending       WBC- 8.20  --->>,  5.36  --->>,  12.58  --->>  Temp trend- 24hrs T(F): 99.3 (02-18 @ 15:22), Max: 99.3 (02-18 @ 15:22)  Zosyn 3.375 q8 course for 10 days, end date 3/1  Procalcitonin: 14  [ ] pending OR cultures  Cultures:  Ucx 2/16-E. Coli   ENDO:     Glucose, Serum: 108 (02-18 @ 04:30)    LINES/DRAINS:  PIV,     Advanced Directives: Full Code    Indication: Hypotension and tachycardia s/p cysto w/ right ureteral stent placement secondary to hydronephrosis in the setting on stones
36y Female HD#2 POD#1 s/p right cystoscopic right ureteral stent placement secondary to right mild hydronephrosis     Acute pain-controlled with acetaminophen, oxy prn, gabapentin, robaxin     respiratory insufficiency due to fluid overload   requiring 4L NC to keep saturation > 92 %    Activity-ambulate as tolerated   Encourage IS  f/u AM CXR- fluid overload          hemodynamic instability     acute tachycardia and hypotension 2/2 urosepsis , now resolved     - lactate 1.9 post op     - received 2L IVF post op   - NICOM negative overnight    Imaging: EKG sinus tachycardia    Labs: Trop 0.09 --->0.01, f/u 3rd set  - keep map >65       Diet, Regular  IVL when tolerating diet        acute right hydronephrosis 2/2 calculus-s/p right ureteral stent  - flomax per urology recommendations    Monitor UO- voiding  Labs pending           DVT prophylaxis- LVX 40mg daily, SCDs   iv locked   reg diet        WBC- 8.20  --->>,  5.36  --->>,  12.58  --->> 9.6     Zosyn 3.375 q8 course for 10 days, end date 3/1  Procalcitonin: 14  [ ] pending OR cultures  Cultures:  Ucx 2/16-E. Coli   ENDO:     Glucose, Serum: 108 (02-18 @ 04:30)    LINES/DRAINS:  PIV,     Advanced Directives: Full Code
Pt looks much improved. May be able to d/c today and f/u with Dr. Villegas. All questions answered. Seen with ACP and agree with nte above.

## 2022-02-21 NOTE — DISCHARGE NOTE NURSING/CASE MANAGEMENT/SOCIAL WORK - PATIENT PORTAL LINK FT
You can access the FollowMyHealth Patient Portal offered by [facility] by registering at the following website: [email or web address]. By joining [facility]’s FollowMyHealth portal, you will also be able to view your health information using other applications (apps) compatible with our system.

## 2022-02-21 NOTE — DISCHARGE NOTE PROVIDER - NSDCCPTREATMENT_GEN_ALL_CORE_FT
PRINCIPAL PROCEDURE  Procedure: Cystoscopic insertion of ureteral stent  Findings and Treatment:

## 2022-02-21 NOTE — PROGRESS NOTE ADULT - ASSESSMENT
37 y/o f s/p right JJ stent    A) stable POD # 3  E Coli UTI  improved CXR    P) Check labs  possible d/c later today on Vantin 200 mg x 14 days  OP f/u for definitive stone management.  will d/w attending 37 y/o f s/p right JJ stent    A) stable POD # 3  E Coli UTI  improved CXR but still with congestion  right pleural effusion    P) Check labs  possible d/c later today on Vantin 200 mg x 14 days  OP f/u for definitive stone management.  will d/w attending

## 2022-02-21 NOTE — DISCHARGE NOTE PROVIDER - NSDCCPCAREPLAN_GEN_ALL_CORE_FT
PRINCIPAL DISCHARGE DIAGNOSIS  Diagnosis: Kidney stone  Assessment and Plan of Treatment:       SECONDARY DISCHARGE DIAGNOSES  Diagnosis: Hypotension  Assessment and Plan of Treatment:

## 2022-02-21 NOTE — DISCHARGE NOTE PROVIDER - CARE PROVIDER_API CALL
Oral Villegas)  Urology  [address] [place] [address], [address]  [place] [address]  Phone: [phone]  Fax: [phone]  Established Patient  Follow Up Time:

## 2022-02-21 NOTE — DISCHARGE NOTE PROVIDER - HOSPITAL COURSE
38 y/o f admitted with uncontrolled right flank pain, nausea and vomiting. Pt was hypotensive, tachycardic, she was taken to the OR for a right JJ stent.  Pt did well and was transferred to the ICU for sepsis. Pt was placed on abx and responded well. Her urine culture grew E Coli sensitive to Zosyn. Her chest x ray revealed   a right pleural effusion and pulmonary congestion. She was treated with Lasix. She continued to do well and was transferred to the floor. She remained afebrile her labs were stable  and repeat CXR was essentially unchanged. She was d/c'd home in stable condition and will f/u in 1 week for definitive stone management. 37 y/o f admitted with uncontrolled right flank pain, nausea and vomiting. Pt was hypotensive, tachycardic, she was taken to the OR for a right JJ stent.  Pt did well and was transferred to the ICU for sepsis. Pt was placed on abx and responded well. Her urine culture grew E Coli sensitive to Zosyn. Her chest x ray revealed   a right pleural effusion and pulmonary congestion. She was treated with Lasix. She continued to do well and was transferred to the floor. She remained afebrile her labs were stable  and repeat CXR was essentially unchanged. She was d/c'd home in stable condition and will f/u in 1 week for definitive stone management.

## 2022-02-21 NOTE — DISCHARGE NOTE PROVIDER - NSDCMRMEDTOKEN_GEN_ALL_CORE_FT
acetaminophen 325 mg oral tablet: 2 tab(s) orally every 6 hours  cefpodoxime 200 mg oral tablet: 1 tab(s) orally 2 times a day MDD:2  Flomax 0.4 mg oral capsule: 1 cap(s) orally once a day MDD:1  oxyCODONE 5 mg oral tablet: 1 tab(s) orally every 6 hours, As Needed -Moderate Pain (4 - 6) - for moderate pain MDD:4    acetaminophen 325 mg oral tablet: 2 tab(s) orally every 6 hours  cefpodoxime 200 mg oral tablet: 1 tab(s) orally 2 times a day MDD:2  Flomax 0.4 mg oral capsule: 1 cap(s) orally once a day MDD:1  ondansetron 4 mg oral tablet: 1 tab(s) orally every 8 hours, As Needed -for nausea MDD:3   oxyCODONE 5 mg oral tablet: 1 tab(s) orally every 6 hours, As Needed -Moderate Pain (4 - 6) - for moderate pain MDD:4

## 2022-02-21 NOTE — DISCHARGE NOTE PROVIDER - NSDCFUADDINST_GEN_ALL_CORE_FT
drink plenty of fluids  finish all antibiotics  use stool softeners and laxatives as needed  use incentive spirometer 10x/hr  f/u with your PCP about your chest x-ray.  Call the office with any questions or issues drink plenty of fluids  finish all antibiotics  use stool softeners and laxatives as needed  use incentive spirometer 10x/hr  f/u with your PCP about your chest x-ray.  Return to the hospital if you develop fever  Call the office with any questions or issues drink plenty of fluids  finish all antibiotics  use stool softeners and laxatives as needed  use incentive spirometer 10x/hr  f/u with your PCP about your chest x-ray.  Chest x-ray in 1 week  BMP in 1 week  Return to the hospital if you develop fever  Call the office with any questions or issues

## 2022-02-21 NOTE — PROGRESS NOTE ADULT - SUBJECTIVE AND OBJECTIVE BOX
Progress Note POD # 3    Subjective Pt seen and examined at bedside   35 y/o Female s/p R JJ stent for hydronephrosis 2/2 obstructing 3 x 2 x 3 mm stone at the UVJ.  Pt doing better, No pain, dysuria, hematuria. No CP or SOB.    [ x ] a 10 point review of systems was negative except where noted    Vital signs  T(C): , Max: 36.8 (02-21-22 @ 00:31)  HR: 71 (02-21-22 @ 04:49)  BP: 113/61 (02-21-22 @ 04:49)  SpO2: 96% (02-21-22 @ 04:49)    Gen NC/AT in NAD  SKIN warm, dry with good tugor  Neck supple, FROM  LUNGS No dyspnea, No accessory muscle use  BACK No CVAT  ABD    Soft non tender, bladder not palpable   voiding freely      Labs                        9.7    7.55  )-----------( 142      ( 20 Feb 2022 20:00 )             28.6     20 Feb 2022 20:00    139    |  100    |  13     ----------------------------<  89     3.3     |  25     |  0.8      Ca    8.3        20 Feb 2022 20:00  Phos  2.9       20 Feb 2022 20:00  Mg     1.6       20 Feb 2022 20:00      CXR pending official read  Looks improved                             Progress Note POD # 3    Subjective Pt seen and examined at bedside   37 y/o Female s/p R JJ stent for hydronephrosis 2/2 obstructing 3 x 2 x 3 mm stone at the UVJ.  Pt doing better, No pain, dysuria, hematuria. No CP or SOB.    [ x ] a 10 point review of systems was negative except where noted    Vital signs  T(C): , Max: 36.8 (02-21-22 @ 00:31)  HR: 71 (02-21-22 @ 04:49)  BP: 113/61 (02-21-22 @ 04:49)  SpO2: 96% (02-21-22 @ 04:49)    Gen NC/AT in NAD  SKIN warm, dry with good tugor  Neck supple, FROM  LUNGS No dyspnea, No accessory muscle use  BACK No CVAT  ABD    Soft non tender, bladder not palpable   voiding freely      Labs                        9.7    7.55  )-----------( 142      ( 20 Feb 2022 20:00 )             28.6     20 Feb 2022 20:00    139    |  100    |  13     ----------------------------<  89     3.3     |  25     |  0.8      Ca    8.3        20 Feb 2022 20:00  Phos  2.9       20 Feb 2022 20:00  Mg     1.6       20 Feb 2022 20:00      CXR pending official read  Looks improved but still with mild congestion.    official read    < from: Xray Chest 1 View AP/PA (02.21.22 @ 08:22) >    ACC: [id number] EXAM:  XR CHEST 1 VIEW                          PROCEDURE DATE:  02/21/2022          INTERPRETATION:  Clinical History / Reason for exam: Congestion    Comparison : Chest radiograph February 20, 2022.    Technique/Positioning: Single AP view of the chest.    Findings:    Support devices: None.    Cardiac/mediastinum/hilum: Unchanged.    Lung parenchyma/Pleura: Pulmonary vascular congestion and right pleural   effusion, unchanged. No pneumothorax.    Skeleton/soft tissues: Unchanged.    Impression:    Pulmonary vascular congestion and right pleural effusion, unchanged.        --- End of Report ---    ELLIOT LANDAU MD; Attending Radiologist  This document has been electronically signed. Feb 21 2022 10:39AM    < end of copied text >

## 2022-02-22 PROBLEM — Z00.00 ENCOUNTER FOR PREVENTIVE HEALTH EXAMINATION: Status: ACTIVE | Noted: 2022-02-22

## 2022-02-23 PROBLEM — Z00.00 ENCOUNTER FOR PREVENTIVE HEALTH EXAMINATION: Noted: 2022-02-23

## 2022-02-24 LAB
CULTURE RESULTS: SIGNIFICANT CHANGE UP
SPECIMEN SOURCE: SIGNIFICANT CHANGE UP

## 2022-02-25 DIAGNOSIS — B96.20 UNSPECIFIED ESCHERICHIA COLI [E. COLI] AS THE CAUSE OF DISEASES CLASSIFIED ELSEWHERE: ICD-10-CM

## 2022-02-25 DIAGNOSIS — E87.70 FLUID OVERLOAD, UNSPECIFIED: ICD-10-CM

## 2022-02-25 DIAGNOSIS — R10.9 UNSPECIFIED ABDOMINAL PAIN: ICD-10-CM

## 2022-02-25 DIAGNOSIS — J90 PLEURAL EFFUSION, NOT ELSEWHERE CLASSIFIED: ICD-10-CM

## 2022-02-25 DIAGNOSIS — J98.11 ATELECTASIS: ICD-10-CM

## 2022-02-25 DIAGNOSIS — N13.6 PYONEPHROSIS: ICD-10-CM

## 2022-02-25 DIAGNOSIS — A41.9 SEPSIS, UNSPECIFIED ORGANISM: ICD-10-CM

## 2022-02-25 DIAGNOSIS — Z20.822 CONTACT WITH AND (SUSPECTED) EXPOSURE TO COVID-19: ICD-10-CM

## 2022-02-25 DIAGNOSIS — N17.9 ACUTE KIDNEY FAILURE, UNSPECIFIED: ICD-10-CM

## 2022-03-04 ENCOUNTER — APPOINTMENT (OUTPATIENT)
Dept: UROLOGY | Facility: CLINIC | Age: 37
End: 2022-03-04
Payer: COMMERCIAL

## 2022-03-04 DIAGNOSIS — N20.1 CALCULUS OF URETER: ICD-10-CM

## 2022-03-04 DIAGNOSIS — R10.9 UNSPECIFIED ABDOMINAL PAIN: ICD-10-CM

## 2022-03-04 PROCEDURE — 99213 OFFICE O/P EST LOW 20 MIN: CPT

## 2022-03-04 NOTE — HISTORY OF PRESENT ILLNESS
[FreeTextEntry1] : 37 y/o Female s/p R JJ stent for hydronephrosis 2/2 obstructing 3 x 2 x 3 mm\par stone at the UVJ.\par Pt doing better, No pain, dysuria, hematuria. No CP or SOB.\par \par currently with some blood in the urine -- no burning with urination\par typical stent pain\par \par has not seen a stone pass\par

## 2022-03-04 NOTE — ASSESSMENT
[FreeTextEntry1] : 35 y/o Female s/p R JJ stent for hydronephrosis 2/2 obstructing 3 x 2 x 3 mm\par stone at the UVJ.\par Pt doing better, No pain, dysuria, hematuria. No CP or SOB.\par \par currently with some blood in the urine -- no burning with urination\par typical stent pain\par \par has not seen a stone pass\par

## 2022-03-10 ENCOUNTER — APPOINTMENT (OUTPATIENT)
Dept: UROLOGY | Facility: HOSPITAL | Age: 37
End: 2022-03-10

## 2022-07-13 NOTE — ED ADULT NURSE NOTE - NS ED NURSE PATIENT LEFT UNIT TIME
[Right] : right knee [5___] : hamstring 5[unfilled]/5 [Equivocal] : equivocal Talisha [] : no deformities of patellar tendon [[id number]] : flexion 125 degrees [de-identified] : extension 0 degrees 01:33

## 2024-10-17 NOTE — PATIENT PROFILE ADULT - NSTRANSFERBELONGINGSDISPO_GEN_A_NUR
and Sexual Activity    Alcohol use: Not Currently     Alcohol/week: 0.0 standard drinks of alcohol    Drug use: No    Sexual activity: Not on file   Other Topics Concern    Not on file   Social History Narrative    Not on file     Social Determinants of Health     Financial Resource Strain: Low Risk  (8/17/2023)    Overall Financial Resource Strain (CARDIA)     Difficulty of Paying Living Expenses: Not hard at all   Food Insecurity: No Food Insecurity (8/12/2024)    Received from [facility]    Hunger Screening     Within the past 12 months we worried whether our food would run out before we got money to buy more.: Never True     Within the past 12 months the food we bought just didn't last and we didn't have money to get more.: Never True   Transportation Needs: Unknown (8/17/2023)    PRAPARE - Transportation     Lack of Transportation (Medical): Not on file     Lack of Transportation (Non-Medical): No   Physical Activity: Not on file   Stress: Not on file   Social Connections: Patient Declined (10/17/2024)    Social Connections ([facility] HRSN)     If for any reason you need help with day-to-day activities such as bathing, preparing meals, shopping, managing finances, etc., do you get the help you need?: Not on file   Intimate Partner Violence: Not on file   Housing Stability: Unknown (8/17/2023)    Housing Stability Vital Sign     Unable to Pay for Housing in the Last Year: Not on file     Number of Places Lived in the Last Year: Not on file     Unstable Housing in the Last Year: No       Review of Systems:  Review of Systems   Constitutional: Negative for chills and fever.   Cardiovascular:  Negative for chest pain and palpitations.   Respiratory:  Negative for cough and shortness of breath.    Musculoskeletal:  Positive for neck pain.   Gastrointestinal:  Negative for constipation.   Neurological:  Positive for headaches. Negative for disturbances in coordination, loss of balance, numbness, tingling and  with patient

## 2025-03-27 NOTE — PRE-OP CHECKLIST - VIA
I have personally seen and examined the patient. I have collaborated with and supervised the
patient bed

## 2025-06-03 ENCOUNTER — NON-APPOINTMENT (OUTPATIENT)
Age: 40
End: 2025-06-03